# Patient Record
Sex: MALE | Race: WHITE | Employment: OTHER | URBAN - METROPOLITAN AREA
[De-identification: names, ages, dates, MRNs, and addresses within clinical notes are randomized per-mention and may not be internally consistent; named-entity substitution may affect disease eponyms.]

---

## 2018-07-11 ENCOUNTER — OFFICE VISIT (OUTPATIENT)
Dept: INTERNAL MEDICINE CLINIC | Age: 73
End: 2018-07-11
Payer: MEDICARE

## 2018-07-11 VITALS
SYSTOLIC BLOOD PRESSURE: 118 MMHG | HEIGHT: 72 IN | BODY MASS INDEX: 27.5 KG/M2 | WEIGHT: 203 LBS | DIASTOLIC BLOOD PRESSURE: 80 MMHG

## 2018-07-11 DIAGNOSIS — F51.01 PRIMARY INSOMNIA: ICD-10-CM

## 2018-07-11 DIAGNOSIS — R93.89 ABNORMAL FINDINGS ON DIAGNOSTIC IMAGING OF OTHER SPECIFIED BODY STRUCTURES: ICD-10-CM

## 2018-07-11 DIAGNOSIS — D61.818 PANCYTOPENIA (HCC): ICD-10-CM

## 2018-07-11 DIAGNOSIS — H91.93 DECREASED HEARING OF BOTH EARS: ICD-10-CM

## 2018-07-11 DIAGNOSIS — E78.2 MIXED HYPERLIPIDEMIA: ICD-10-CM

## 2018-07-11 DIAGNOSIS — J05.10: Primary | ICD-10-CM

## 2018-07-11 DIAGNOSIS — Z12.11 COLON CANCER SCREENING: ICD-10-CM

## 2018-07-11 PROBLEM — H91.90 HEARING DECREASED: Status: ACTIVE | Noted: 2018-07-11

## 2018-07-11 PROCEDURE — 1036F TOBACCO NON-USER: CPT | Performed by: INTERNAL MEDICINE

## 2018-07-11 PROCEDURE — 1101F PT FALLS ASSESS-DOCD LE1/YR: CPT | Performed by: INTERNAL MEDICINE

## 2018-07-11 PROCEDURE — 99204 OFFICE O/P NEW MOD 45 MIN: CPT | Performed by: INTERNAL MEDICINE

## 2018-07-11 PROCEDURE — 1123F ACP DISCUSS/DSCN MKR DOCD: CPT | Performed by: INTERNAL MEDICINE

## 2018-07-11 PROCEDURE — 4040F PNEUMOC VAC/ADMIN/RCVD: CPT | Performed by: INTERNAL MEDICINE

## 2018-07-11 PROCEDURE — G8427 DOCREV CUR MEDS BY ELIG CLIN: HCPCS | Performed by: INTERNAL MEDICINE

## 2018-07-11 PROCEDURE — 3017F COLORECTAL CA SCREEN DOC REV: CPT | Performed by: INTERNAL MEDICINE

## 2018-07-11 PROCEDURE — G8419 CALC BMI OUT NRM PARAM NOF/U: HCPCS | Performed by: INTERNAL MEDICINE

## 2018-07-11 RX ORDER — SIMVASTATIN 20 MG
TABLET ORAL
COMMUNITY
Start: 2018-04-06

## 2018-07-11 ASSESSMENT — ENCOUNTER SYMPTOMS
DIARRHEA: 0
COUGH: 0
SHORTNESS OF BREATH: 0
BLOOD IN STOOL: 0
EYE PAIN: 0
TROUBLE SWALLOWING: 0
EYE DISCHARGE: 0
COLOR CHANGE: 0
ABDOMINAL DISTENTION: 0
WHEEZING: 0

## 2018-07-11 ASSESSMENT — PATIENT HEALTH QUESTIONNAIRE - PHQ9
SUM OF ALL RESPONSES TO PHQ9 QUESTIONS 1 & 2: 0
SUM OF ALL RESPONSES TO PHQ QUESTIONS 1-9: 0
1. LITTLE INTEREST OR PLEASURE IN DOING THINGS: 0
2. FEELING DOWN, DEPRESSED OR HOPELESS: 0

## 2018-07-11 NOTE — PROGRESS NOTES
breath and wheezing. Cardiovascular: Negative for chest pain and palpitations. Gastrointestinal: Negative for abdominal distention, blood in stool and diarrhea. Endocrine: Negative for polydipsia and polyphagia. Genitourinary: Negative for difficulty urinating and frequency. Musculoskeletal: Negative for gait problem, myalgias and neck pain. Skin: Negative for color change and rash. Allergic/Immunologic: Negative for environmental allergies and food allergies. Neurological: Negative for dizziness and headaches. Hematological: Negative for adenopathy. Does not bruise/bleed easily. Psychiatric/Behavioral: Negative for behavioral problems and sleep disturbance. Objective:   Physical Exam   Constitutional: He is oriented to person, place, and time. He appears well-developed and well-nourished. HENT:   Head: Normocephalic and atraumatic. Hard of hearing     Eyes: Conjunctivae and EOM are normal. Right eye exhibits no discharge. Left eye exhibits no discharge. Right conjunctiva is not injected. Left conjunctiva is not injected. Right eye exhibits normal extraocular motion. Left eye exhibits normal extraocular motion. Neck: Normal range of motion. Neck supple. No JVD present. No edema and no erythema present. No thyroid mass and no thyromegaly present. Cardiovascular: Normal rate and regular rhythm. Exam reveals no friction rub. No murmur heard. Pulmonary/Chest: Effort normal and breath sounds normal. No accessory muscle usage. No tachypnea and no bradypnea. No respiratory distress. He has no wheezes. He has no rales. Abdominal: Soft. Bowel sounds are normal. He exhibits no distension. There is no tenderness. There is no rebound. Musculoskeletal: Normal range of motion. He exhibits no edema or tenderness. Lymphadenopathy:        Head (right side): No submental and no submandibular adenopathy present. Head (left side): No submental and no submandibular adenopathy present. He has no cervical adenopathy. Neurological: He is alert and oriented to person, place, and time. He displays no atrophy. No cranial nerve deficit or sensory deficit. He exhibits normal muscle tone. Coordination normal.   Skin: Skin is warm. No bruising, no ecchymosis and no rash noted. He is not diaphoretic. No pallor. Psychiatric: He has a normal mood and affect. His behavior is normal. His mood appears not anxious. His affect is not angry. His speech is not slurred. He is not aggressive. Cognition and memory are not impaired. He expresses no homicidal ideation. I have personally reviewed and agree with the patient FLY ABHISHEK Tata Guthrie is a 74 y.o. male who presents today for follow up on his chronic medical conditions as noted below. Ravi Ayala is c/o of   Chief Complaint   Patient presents with   Vaibhav Vidal New Doctor    Follow-Up from Hospital     Epiglottitis, thrombocytopenia, elevated bp    [facility] Maintenance     refusing colonoscopy, pneumovax        Patient Active Problem List:     Epiglottiditis     Mixed hyperlipidemia     Hearing decreased     Primary insomnia     Past Medical History:   Diagnosis Date    Hyperlipidemia       Past Surgical History:   Procedure Laterality Date    VASECTOMY       Family History   Problem Relation Age of Onset    Cancer Mother 62        lung    Diabetes Maternal Uncle      Current Outpatient Prescriptions   Medication Sig Dispense Refill    simvastatin (ZOCOR) 20 MG tablet        No current facility-administered medications for this visit.       ALLERGIES:    Allergies   Allergen Reactions    Doxycycline Hives    Oxycodone-Acetaminophen Hives    Pcn [Penicillins] Hives    Pneumococcal Vaccines Hives       Social History   Substance Use Topics    Smoking status: Former Smoker     Packs/day: 0.25     Years: 50.00     Types: Cigarettes     Quit date: 7/11/2007    Smokeless tobacco: Never Used    Alcohol use 1.2 oz/week     2

## 2018-07-25 ENCOUNTER — OFFICE VISIT (OUTPATIENT)
Dept: INTERNAL MEDICINE CLINIC | Age: 73
End: 2018-07-25
Payer: MEDICARE

## 2018-07-25 VITALS
BODY MASS INDEX: 27.36 KG/M2 | WEIGHT: 202 LBS | SYSTOLIC BLOOD PRESSURE: 118 MMHG | HEIGHT: 72 IN | DIASTOLIC BLOOD PRESSURE: 64 MMHG | OXYGEN SATURATION: 98 % | HEART RATE: 64 BPM

## 2018-07-25 DIAGNOSIS — D72.810 LYMPHOCYTOPENIA: ICD-10-CM

## 2018-07-25 DIAGNOSIS — D69.6 THROMBOCYTOPENIA, ACQUIRED (HCC): ICD-10-CM

## 2018-07-25 DIAGNOSIS — E78.2 MIXED HYPERLIPIDEMIA: Primary | ICD-10-CM

## 2018-07-25 PROCEDURE — 99213 OFFICE O/P EST LOW 20 MIN: CPT | Performed by: INTERNAL MEDICINE

## 2018-07-25 PROCEDURE — 1101F PT FALLS ASSESS-DOCD LE1/YR: CPT | Performed by: INTERNAL MEDICINE

## 2018-07-25 PROCEDURE — 3017F COLORECTAL CA SCREEN DOC REV: CPT | Performed by: INTERNAL MEDICINE

## 2018-07-25 PROCEDURE — G8419 CALC BMI OUT NRM PARAM NOF/U: HCPCS | Performed by: INTERNAL MEDICINE

## 2018-07-25 PROCEDURE — 4040F PNEUMOC VAC/ADMIN/RCVD: CPT | Performed by: INTERNAL MEDICINE

## 2018-07-25 PROCEDURE — 1123F ACP DISCUSS/DSCN MKR DOCD: CPT | Performed by: INTERNAL MEDICINE

## 2018-07-25 PROCEDURE — 1036F TOBACCO NON-USER: CPT | Performed by: INTERNAL MEDICINE

## 2018-07-25 PROCEDURE — G8427 DOCREV CUR MEDS BY ELIG CLIN: HCPCS | Performed by: INTERNAL MEDICINE

## 2018-07-25 ASSESSMENT — ENCOUNTER SYMPTOMS
EYE DISCHARGE: 0
COLOR CHANGE: 0
ABDOMINAL DISTENTION: 0
EYE PAIN: 0
DIARRHEA: 0
SHORTNESS OF BREATH: 0
WHEEZING: 0
BLOOD IN STOOL: 0
TROUBLE SWALLOWING: 0
COUGH: 0

## 2018-07-25 NOTE — PROGRESS NOTES
Subjective:      Patient ID: Mitchell Harmon is a 74 y.o. male. Visit Information    Have you changed or started any medications since your last visit including any over-the-counter medicines, vitamins, or herbal medicines? no   Are you having any side effects from any of your medications? -  no  Have you stopped taking any of your medications? Is so, why? -  no    Have you seen any other physician or provider since your last visit? No  Have you had any other diagnostic tests since your last visit? No  Have you been seen in the emergency room and/or had an admission to a hospital since we last saw you? No  Have you had your routine dental cleaning in the past 6 months? no    Have you activated your [employer] account? If not, what are your barriers? No: declined     Patient Care Team:  Guerline Shannon MD as PCP - General (Internal Medicine)    Medical History Review  Past Medical, Family, and Social History reviewed and does contribute to the patient presenting condition    Health Maintenance   Topic Date Due    AAA screen  1945    DTaP/Tdap/Td vaccine (1 - Tdap) 12/26/1962    Lipid screen  12/26/1983    Shingles Vaccine (1 of 2 - 2 Dose Series) 12/26/1993    Colon cancer screen colonoscopy  07/26/2019 (Originally 12/26/1993)    Flu vaccine (1) 09/01/2018     Chief Complaint   Patient presents with    Hyperlipidemia     had labs don in care every where        Here for f/u on labs  lwo blood count  No burise  HLD  Onset more than 5 years ago  Severity is mild, not getting worse  Not associated with pancreatitis  Tolerating statin well no muscle pain          Review of Systems   Constitutional: Negative for appetite change, diaphoresis and fatigue. HENT: Negative for ear discharge and trouble swallowing. Eyes: Negative for pain and discharge. Respiratory: Negative for cough, shortness of breath and wheezing. Cardiovascular: Negative for chest pain and palpitations.    Gastrointestinal: Negative

## 2018-08-01 ENCOUNTER — TELEPHONE (OUTPATIENT)
Dept: INTERNAL MEDICINE CLINIC | Age: 73
End: 2018-08-01

## 2018-08-01 NOTE — TELEPHONE ENCOUNTER
Patient had all his labs done at [facility]. Dr Lennox Allan already discussed these with him but he would like a copy. Please call his wife when you receive the hard copy and she will pick these up. They like to have copies because they travel a lot.

## 2018-08-03 NOTE — TELEPHONE ENCOUNTER
Still have not received hard copy. Spoke with pt and suggested that he sign up for [facility]. Pt is agreeable and will sign up today.

## 2018-09-26 ENCOUNTER — HOSPITAL ENCOUNTER (OUTPATIENT)
Age: 73
Setting detail: SPECIMEN
Discharge: HOME | End: 2018-09-26
Payer: MEDICARE

## 2018-09-26 DIAGNOSIS — D69.6 THROMBOCYTOPENIA, ACQUIRED (HCC): ICD-10-CM

## 2018-09-26 DIAGNOSIS — D72.810 LYMPHOCYTOPENIA: ICD-10-CM

## 2018-09-26 LAB
ABSOLUTE EOS #: 0.03 K/UL (ref 0–0.4)
ABSOLUTE IMMATURE GRANULOCYTE: 0 K/UL (ref 0–0.3)
ABSOLUTE LYMPH #: 1.11 K/UL (ref 1–4.8)
ABSOLUTE MONO #: 0.58 K/UL (ref 0.1–0.8)
BASOPHILS # BLD: 1 % (ref 0–2)
BASOPHILS ABSOLUTE: 0.03 K/UL (ref 0–0.2)
DIFFERENTIAL TYPE: ABNORMAL
EOSINOPHILS RELATIVE PERCENT: 1 % (ref 1–4)
HCT VFR BLD CALC: 37.2 % (ref 40.7–50.3)
HEMOGLOBIN: 12.5 G/DL (ref 13–17)
IMMATURE GRANULOCYTES: 0 %
LYMPHOCYTES # BLD: 45 % (ref 24–44)
MCH RBC QN AUTO: 34.4 PG (ref 25.2–33.5)
MCHC RBC AUTO-ENTMCNC: 33.6 G/DL (ref 28.4–34.8)
MCV RBC AUTO: 102.5 FL (ref 82.6–102.9)
MONOCYTES # BLD: 23 % (ref 1–7)
MORPHOLOGY: ABNORMAL
NRBC AUTOMATED: 0 PER 100 WBC
PDW BLD-RTO: 13.1 % (ref 11.8–14.4)
PLATELET # BLD: 99 K/UL (ref 138–453)
PLATELET ESTIMATE: ABNORMAL
PMV BLD AUTO: 12.6 FL (ref 8.1–13.5)
RBC # BLD: 3.63 M/UL (ref 4.21–5.77)
RBC # BLD: ABNORMAL 10*6/UL
SEG NEUTROPHILS: 30 % (ref 36–66)
SEGMENTED NEUTROPHILS ABSOLUTE COUNT: 0.75 K/UL (ref 1.8–7.7)
WBC # BLD: 2.5 K/UL (ref 3.5–11.3)
WBC # BLD: ABNORMAL 10*3/UL

## 2023-01-22 ENCOUNTER — APPOINTMENT (OUTPATIENT)
Dept: GENERAL RADIOLOGY | Age: 78
End: 2023-01-22
Payer: MEDICARE

## 2023-01-22 ENCOUNTER — HOSPITAL ENCOUNTER (INPATIENT)
Age: 78
LOS: 3 days | Discharge: HOME | End: 2023-01-25
Attending: EMERGENCY MEDICINE | Admitting: INTERNAL MEDICINE
Payer: MEDICARE

## 2023-01-22 ENCOUNTER — APPOINTMENT (OUTPATIENT)
Dept: CT IMAGING | Age: 78
End: 2023-01-22
Payer: MEDICARE

## 2023-01-22 DIAGNOSIS — R55 SYNCOPE AND COLLAPSE: Primary | ICD-10-CM

## 2023-01-22 PROBLEM — J44.9 COPD (CHRONIC OBSTRUCTIVE PULMONARY DISEASE) (HCC): Status: ACTIVE | Noted: 2023-01-22

## 2023-01-22 PROBLEM — G96.08 SUBDURAL HYGROMA: Status: ACTIVE | Noted: 2023-01-22

## 2023-01-22 PROBLEM — U07.1 COVID-19: Status: ACTIVE | Noted: 2023-01-22

## 2023-01-22 PROBLEM — I95.1 ORTHOSTATIC HYPOTENSION: Status: ACTIVE | Noted: 2023-01-22

## 2023-01-22 PROBLEM — M35.3 POLYMYALGIA RHEUMATICA (HCC): Status: ACTIVE | Noted: 2023-01-22

## 2023-01-22 LAB
ABSOLUTE EOS #: 0 K/UL (ref 0–0.4)
ABSOLUTE IMMATURE GRANULOCYTE: 0.37 K/UL (ref 0–0.3)
ABSOLUTE LYMPH #: 0.64 K/UL (ref 1–4.8)
ABSOLUTE MONO #: 0.64 K/UL (ref 0.1–0.8)
ANION GAP SERPL CALCULATED.3IONS-SCNC: 13 MMOL/L (ref 9–17)
ANION GAP: 13 MMOL/L (ref 7–16)
BASOPHILS # BLD: 1 % (ref 0–2)
BASOPHILS ABSOLUTE: 0.05 K/UL (ref 0–0.2)
BILIRUBIN URINE: NEGATIVE
BUN BLDV-MCNC: 20 MG/DL (ref 8–23)
CALCIUM SERPL-MCNC: 8.3 MG/DL (ref 8.6–10.4)
CHLORIDE BLD-SCNC: 99 MMOL/L (ref 98–107)
CO2: 22 MMOL/L (ref 20–31)
COLOR: YELLOW
COMMENT UA: NORMAL
CREAT SERPL-MCNC: 0.84 MG/DL (ref 0.7–1.2)
EGFR, POC: >60 ML/MIN/1.73M2
EOSINOPHILS RELATIVE PERCENT: 0 % (ref 1–4)
GFR SERPL CREATININE-BSD FRML MDRD: >60 ML/MIN/1.73M2
GLUCOSE BLD-MCNC: 112 MG/DL (ref 70–99)
GLUCOSE BLD-MCNC: 123 MG/DL (ref 74–100)
GLUCOSE URINE: NEGATIVE
HCO3 VENOUS: 24.8 MMOL/L (ref 22–29)
HCT VFR BLD CALC: 31.1 % (ref 40.7–50.3)
HEMOGLOBIN: 10.8 G/DL (ref 13–17)
IMMATURE GRANULOCYTES: 8 %
KETONES, URINE: NEGATIVE
LEUKOCYTE ESTERASE, URINE: NEGATIVE
LYMPHOCYTES # BLD: 14 % (ref 24–44)
MAGNESIUM: 1.5 MG/DL (ref 1.6–2.6)
MCH RBC QN AUTO: 36.5 PG (ref 25.2–33.5)
MCHC RBC AUTO-ENTMCNC: 34.7 G/DL (ref 28.4–34.8)
MCV RBC AUTO: 105.1 FL (ref 82.6–102.9)
MONOCYTES # BLD: 14 % (ref 1–7)
MORPHOLOGY: ABNORMAL
NITRITE, URINE: NEGATIVE
NRBC AUTOMATED: 0 PER 100 WBC
O2 SAT, VEN: 84 % (ref 60–85)
PCO2, VEN: 36.1 MM HG (ref 41–51)
PDW BLD-RTO: 13.1 % (ref 11.8–14.4)
PH UA: 7.5 (ref 5–8)
PH VENOUS: 7.45 (ref 7.32–7.43)
PLATELET # BLD: ABNORMAL K/UL (ref 138–453)
PLATELET, FLUORESCENCE: NORMAL K/UL (ref 138–453)
PO2, VEN: 47 MM HG (ref 30–50)
POC BUN: 22 MG/DL (ref 8–26)
POC CHLORIDE: 99 MMOL/L (ref 98–107)
POC CREATININE: 1.03 MG/DL (ref 0.51–1.19)
POC HEMATOCRIT: 32 % (ref 41–53)
POC HEMOGLOBIN: 10.9 G/DL (ref 13.5–17.5)
POC IONIZED CALCIUM: 1.14 MMOL/L (ref 1.15–1.33)
POC LACTIC ACID: 1.44 MMOL/L (ref 0.56–1.39)
POC POTASSIUM: 4.3 MMOL/L (ref 3.5–4.5)
POC SODIUM: 135 MMOL/L (ref 138–146)
POC TCO2: 24 MMOL/L (ref 22–30)
POSITIVE BASE EXCESS, VEN: 1 (ref 0–3)
POTASSIUM SERPL-SCNC: 3.9 MMOL/L (ref 3.7–5.3)
PROTEIN UA: NEGATIVE
RBC # BLD: 2.96 M/UL (ref 4.21–5.77)
SEG NEUTROPHILS: 63 % (ref 36–66)
SEGMENTED NEUTROPHILS ABSOLUTE COUNT: 2.9 K/UL (ref 1.8–7.7)
SODIUM BLD-SCNC: 134 MMOL/L (ref 135–144)
SPECIFIC GRAVITY UA: 1.02 (ref 1–1.03)
TROPONIN, HIGH SENSITIVITY: 8 NG/L (ref 0–22)
TROPONIN, HIGH SENSITIVITY: 9 NG/L (ref 0–22)
TURBIDITY: CLEAR
URINE HGB: NEGATIVE
UROBILINOGEN, URINE: NORMAL
WBC # BLD: 4.6 K/UL (ref 3.5–11.3)

## 2023-01-22 PROCEDURE — 80048 BASIC METABOLIC PNL TOTAL CA: CPT

## 2023-01-22 PROCEDURE — 2580000003 HC RX 258

## 2023-01-22 PROCEDURE — 6360000002 HC RX W HCPCS

## 2023-01-22 PROCEDURE — 84484 ASSAY OF TROPONIN QUANT: CPT

## 2023-01-22 PROCEDURE — 2580000003 HC RX 258: Performed by: HEALTH CARE PROVIDER

## 2023-01-22 PROCEDURE — 82803 BLOOD GASES ANY COMBINATION: CPT

## 2023-01-22 PROCEDURE — 6360000002 HC RX W HCPCS: Performed by: HEALTH CARE PROVIDER

## 2023-01-22 PROCEDURE — 80051 ELECTROLYTE PANEL: CPT

## 2023-01-22 PROCEDURE — 85025 COMPLETE CBC W/AUTO DIFF WBC: CPT

## 2023-01-22 PROCEDURE — 71045 X-RAY EXAM CHEST 1 VIEW: CPT

## 2023-01-22 PROCEDURE — 85055 RETICULATED PLATELET ASSAY: CPT

## 2023-01-22 PROCEDURE — 2060000000 HC ICU INTERMEDIATE R&B

## 2023-01-22 PROCEDURE — 84520 ASSAY OF UREA NITROGEN: CPT

## 2023-01-22 PROCEDURE — 96374 THER/PROPH/DIAG INJ IV PUSH: CPT

## 2023-01-22 PROCEDURE — 81003 URINALYSIS AUTO W/O SCOPE: CPT

## 2023-01-22 PROCEDURE — 6360000002 HC RX W HCPCS: Performed by: EMERGENCY MEDICINE

## 2023-01-22 PROCEDURE — 99222 1ST HOSP IP/OBS MODERATE 55: CPT | Performed by: PSYCHIATRY & NEUROLOGY

## 2023-01-22 PROCEDURE — 96375 TX/PRO/DX INJ NEW DRUG ADDON: CPT

## 2023-01-22 PROCEDURE — 71260 CT THORAX DX C+: CPT | Performed by: HEALTH CARE PROVIDER

## 2023-01-22 PROCEDURE — 83735 ASSAY OF MAGNESIUM: CPT

## 2023-01-22 PROCEDURE — 99285 EMERGENCY DEPT VISIT HI MDM: CPT

## 2023-01-22 PROCEDURE — 82565 ASSAY OF CREATININE: CPT

## 2023-01-22 PROCEDURE — 85014 HEMATOCRIT: CPT

## 2023-01-22 PROCEDURE — 83605 ASSAY OF LACTIC ACID: CPT

## 2023-01-22 PROCEDURE — 70450 CT HEAD/BRAIN W/O DYE: CPT

## 2023-01-22 PROCEDURE — 99223 1ST HOSP IP/OBS HIGH 75: CPT | Performed by: INTERNAL MEDICINE

## 2023-01-22 PROCEDURE — 6360000004 HC RX CONTRAST MEDICATION: Performed by: HEALTH CARE PROVIDER

## 2023-01-22 PROCEDURE — 93005 ELECTROCARDIOGRAM TRACING: CPT | Performed by: HEALTH CARE PROVIDER

## 2023-01-22 PROCEDURE — 82947 ASSAY GLUCOSE BLOOD QUANT: CPT

## 2023-01-22 PROCEDURE — 82330 ASSAY OF CALCIUM: CPT

## 2023-01-22 PROCEDURE — 72125 CT NECK SPINE W/O DYE: CPT

## 2023-01-22 RX ORDER — SODIUM CHLORIDE 9 MG/ML
INJECTION, SOLUTION INTRAVENOUS PRN
Status: DISCONTINUED | OUTPATIENT
Start: 2023-01-22 | End: 2023-01-25 | Stop reason: HOSPADM

## 2023-01-22 RX ORDER — DEXAMETHASONE 4 MG/1
4 TABLET ORAL EVERY 12 HOURS SCHEDULED
Status: COMPLETED | OUTPATIENT
Start: 2023-01-22 | End: 2023-01-24

## 2023-01-22 RX ORDER — SODIUM CHLORIDE 0.9 % (FLUSH) 0.9 %
5-40 SYRINGE (ML) INJECTION PRN
Status: DISCONTINUED | OUTPATIENT
Start: 2023-01-22 | End: 2023-01-25 | Stop reason: HOSPADM

## 2023-01-22 RX ORDER — MAGNESIUM SULFATE IN WATER 40 MG/ML
2000 INJECTION, SOLUTION INTRAVENOUS ONCE
Status: COMPLETED | OUTPATIENT
Start: 2023-01-22 | End: 2023-01-22

## 2023-01-22 RX ORDER — ACETAMINOPHEN 650 MG/1
650 SUPPOSITORY RECTAL EVERY 6 HOURS PRN
Status: DISCONTINUED | OUTPATIENT
Start: 2023-01-22 | End: 2023-01-25 | Stop reason: HOSPADM

## 2023-01-22 RX ORDER — AZITHROMYCIN 250 MG/1
250 TABLET, FILM COATED ORAL DAILY
Status: COMPLETED | OUTPATIENT
Start: 2023-01-23 | End: 2023-01-24

## 2023-01-22 RX ORDER — ONDANSETRON 2 MG/ML
4 INJECTION INTRAMUSCULAR; INTRAVENOUS EVERY 6 HOURS PRN
Status: DISCONTINUED | OUTPATIENT
Start: 2023-01-22 | End: 2023-01-25 | Stop reason: HOSPADM

## 2023-01-22 RX ORDER — POLYETHYLENE GLYCOL 3350 17 G/17G
17 POWDER, FOR SOLUTION ORAL DAILY PRN
Status: DISCONTINUED | OUTPATIENT
Start: 2023-01-22 | End: 2023-01-25 | Stop reason: HOSPADM

## 2023-01-22 RX ORDER — ENOXAPARIN SODIUM 100 MG/ML
40 INJECTION SUBCUTANEOUS DAILY
Status: DISCONTINUED | OUTPATIENT
Start: 2023-01-22 | End: 2023-01-25 | Stop reason: HOSPADM

## 2023-01-22 RX ORDER — PREDNISONE 1 MG/1
5 TABLET ORAL DAILY
Status: ON HOLD | COMMUNITY
End: 2023-01-25 | Stop reason: SDUPTHER

## 2023-01-22 RX ORDER — SODIUM CHLORIDE 9 MG/ML
INJECTION, SOLUTION INTRAVENOUS CONTINUOUS
Status: DISCONTINUED | OUTPATIENT
Start: 2023-01-22 | End: 2023-01-24

## 2023-01-22 RX ORDER — 0.9 % SODIUM CHLORIDE 0.9 %
1000 INTRAVENOUS SOLUTION INTRAVENOUS ONCE
Status: COMPLETED | OUTPATIENT
Start: 2023-01-22 | End: 2023-01-22

## 2023-01-22 RX ORDER — ONDANSETRON 4 MG/1
4 TABLET, ORALLY DISINTEGRATING ORAL EVERY 8 HOURS PRN
Status: DISCONTINUED | OUTPATIENT
Start: 2023-01-22 | End: 2023-01-25 | Stop reason: HOSPADM

## 2023-01-22 RX ORDER — SODIUM CHLORIDE 0.9 % (FLUSH) 0.9 %
5-40 SYRINGE (ML) INJECTION EVERY 12 HOURS SCHEDULED
Status: DISCONTINUED | OUTPATIENT
Start: 2023-01-22 | End: 2023-01-25 | Stop reason: HOSPADM

## 2023-01-22 RX ORDER — MORPHINE SULFATE 4 MG/ML
4 INJECTION, SOLUTION INTRAMUSCULAR; INTRAVENOUS ONCE
Status: COMPLETED | OUTPATIENT
Start: 2023-01-22 | End: 2023-01-22

## 2023-01-22 RX ORDER — ACETAMINOPHEN 325 MG/1
650 TABLET ORAL EVERY 6 HOURS PRN
Status: DISCONTINUED | OUTPATIENT
Start: 2023-01-22 | End: 2023-01-25 | Stop reason: HOSPADM

## 2023-01-22 RX ADMIN — MAGNESIUM SULFATE HEPTAHYDRATE 2000 MG: 40 INJECTION, SOLUTION INTRAVENOUS at 12:04

## 2023-01-22 RX ADMIN — MORPHINE SULFATE 4 MG: 4 INJECTION INTRAVENOUS at 13:24

## 2023-01-22 RX ADMIN — SODIUM CHLORIDE 1000 ML: 9 INJECTION, SOLUTION INTRAVENOUS at 11:03

## 2023-01-22 RX ADMIN — SODIUM CHLORIDE: 9 INJECTION, SOLUTION INTRAVENOUS at 18:30

## 2023-01-22 RX ADMIN — ENOXAPARIN SODIUM 40 MG: 100 INJECTION SUBCUTANEOUS at 21:24

## 2023-01-22 RX ADMIN — IOPAMIDOL 75 ML: 755 INJECTION, SOLUTION INTRAVENOUS at 11:27

## 2023-01-22 ASSESSMENT — ENCOUNTER SYMPTOMS
NAUSEA: 0
DIARRHEA: 0
WHEEZING: 0
ABDOMINAL PAIN: 0
BACK PAIN: 0
RHINORRHEA: 0
SHORTNESS OF BREATH: 0
COUGH: 0
VOMITING: 0
CONSTIPATION: 0
EYE DISCHARGE: 0
COUGH: 1
SORE THROAT: 0
EYE ITCHING: 0
VOICE CHANGE: 0

## 2023-01-22 ASSESSMENT — PAIN SCALES - GENERAL: PAINLEVEL_OUTOF10: 5

## 2023-01-22 NOTE — ACP (ADVANCE CARE PLANNING)
..Advance Care Planning     Advance Care Planning Activator (Inpatient)  Conversation Note      Date of ACP Conversation: 1/22/2023     Conversation Conducted with: Patient with Decision Making Capacity    ACP Activator: Travis Coulterparis, [address] Decision Maker:     Current Designated Health Care Decision Maker:     Primary Decision Maker: Angelina Newberry - [phone]    Today we documented Decision Maker(s) consistent with ACP documents on file. Care Preferences    Ventilation: \"If you were in your present state of health and suddenly became very ill and were unable to breathe on your own, what would your preference be about the use of a ventilator (breathing machine) if it were available to you? \"      Would the patient desire the use of ventilator (breathing machine)?: no    \"If your health worsens and it becomes clear that your chance of recovery is unlikely, what would your preference be about the use of a ventilator (breathing machine) if it were available to you? \"     Would the patient desire the use of ventilator (breathing machine)?: No      Resuscitation  \"CPR works best to restart the heart when there is a sudden event, like a heart attack, in someone who is otherwise healthy. Unfortunately, CPR does not typically restart the heart for people who have serious health conditions or who are very sick. \"    \"In the event your heart stopped as a result of an underlying serious health condition, would you want attempts to be made to restart your heart (answer \"yes\" for attempt to resuscitate) or would you prefer a natural death (answer \"no\" for do not attempt to resuscitate)? \" yes       [] Yes   [x] No   Educated Patient / Mortimer Hoar regarding differences between Advance Directives and portable DNR orders.     Length of ACP Conversation in minutes:      Conversation Outcomes:  [x] ACP discussion completed  [] Existing advance directive reviewed with patient; no changes to patient's previously recorded wishes  [] New Advance Directive completed  [] Portable Do Not Rescitate prepared for Provider review and signature  [] POLST/POST/MOLST/MOST prepared for Provider review and signature      Follow-up plan:    [] Schedule follow-up conversation to continue planning  [] Referred individual to Provider for additional questions/concerns   [x] Advised patient/agent/surrogate to review completed ACP document and update if needed with changes in condition, patient preferences or care setting    [] This note routed to one or more involved healthcare providers

## 2023-01-22 NOTE — ED PROVIDER NOTES
[facility] ED  Emergency Department Encounter  Emergency Medicine Resident     Pt Alisia Dey MACEY Lamas  MRN: [id number]  [place] 1945  Date of evaluation: 1/22/23  PCP:  Alie Chakraborty MD  Note Started: 10:37 AM EST      CHIEF COMPLAINT       Chief Complaint   Patient presents with    Dizziness       HISTORY OF PRESENT ILLNESS  (Location/Symptom, Timing/Onset, Context/Setting, Quality, Duration, Modifying Factors, Severity.)      Brynn Schroeder is a 76 y.o. male w/ a past medical history significant for leukopenia, anemia, GERD, hyperlipidemiawho presents with concerns for syncopal episode. Patient reports via EMS today for concerns of a syncopal episode at home. Patient was eating breakfast when he states he passed out. He does not remember passing out. Did hit head and was incontinent of urine. Denies any seizure history, no concern for any seizure-like activity. Patient was recently discharged from [facility] after being admitted for COVID-19 pneumonia. Patient was seen at hospital, chest x-ray showed a left lower lobe infiltrate and he tested positive for COVID. Was found to have some multifocal groundglass opacities on the lower extremity visualized in the apical portion on the CT soft tissue benign. Patient was having difficulty swallowing pills due to some pharyngitis and some slight mucosal thickening of the epiglottis. Patient was treated with IV dexamethasone, admitted and treated with IV antibiotics, hydration, IV Decadron, IV remdesivir and had eventually passed a swallow study. Was discharged home on Decadron without any O2 requirements. Patient was discharged on 1/19/22 a course of Keflex 500 mg 3 times a day for the next 5 days as well as Decadron 4 mg twice daily for 5 days. Patient states that he has been fatigued and has had a persistent dry cough but otherwise denies any other complaints at this time.     PAST MEDICAL / SURGICAL / SOCIAL / FAMILY HISTORY      has a past medical history of Hyperlipidemia.  leukopenia, anemia, GERD, hyperlipidemia     has a past surgical history that includes Vasectomy. Social History     Socioeconomic History    Marital status:      Spouse name: Not on file    Number of children: Not on file    Years of education: Not on file    Highest education level: Not on file   Occupational History    Not on file   Tobacco Use    Smoking status: Former     Packs/day: 0.25     Years: 50.00     Pack years: 12.50     Types: Cigarettes     Quit date: 7/11/2007     Years since quitting: 15.5    Smokeless tobacco: Never   Substance and Sexual Activity    Alcohol use: Yes     Alcohol/week: 2.0 standard drinks     Types: 2 Shots of liquor per week    Drug use: No    Sexual activity: Not on file   Other Topics Concern    Not on file   Social History Narrative    Not on file     Social Determinants of Health     Financial Resource Strain: Not on file   Food Insecurity: Not on file   Transportation Needs: Not on file   Physical Activity: Not on file   Stress: Not on file   Social Connections: Not on file   Intimate Partner Violence: Not on file   Housing Stability: Not on file       Family History   Problem Relation Age of Onset    Cancer Mother 62        lung    Diabetes Maternal Uncle        Allergies:  Doxycycline, Oxycodone-acetaminophen, Pcn [penicillins], and Pneumococcal vaccines    Home Medications:  Prior to Admission medications    Medication Sig Start Date End Date Taking? Authorizing Provider   simvastatin (ZOCOR) 20 MG tablet  4/6/18   Historical Provider, MD       REVIEW OF SYSTEMS       Review of Systems   Constitutional:  Positive for fatigue. Negative for chills and fever. HENT:  Negative for ear pain, hearing loss and sore throat. Eyes:  Negative for visual disturbance. Respiratory:  Positive for cough. Negative for shortness of breath. Cardiovascular:  Negative for chest pain.    Gastrointestinal: Negative for abdominal pain, constipation, diarrhea, nausea and vomiting. Genitourinary:  Negative for difficulty urinating and dysuria. Musculoskeletal:  Negative for arthralgias and myalgias. Neurological:  Negative for numbness. Psychiatric/Behavioral:  Negative for agitation and confusion. PHYSICAL EXAM      INITIAL VITALS:   /74   Pulse 68   Temp 97.9 °F (36.6 °C) (Oral)   Resp 18   SpO2 95%     Physical Exam  Vitals and nursing note reviewed. Constitutional:       General: He is not in acute distress. Appearance: He is well-developed. He is not diaphoretic. HENT:      Head: Normocephalic and atraumatic. Right Ear: External ear normal.      Left Ear: External ear normal.      Nose: Nose normal.   Eyes:      Conjunctiva/sclera: Conjunctivae normal.   Neck:      Trachea: No tracheal deviation. Cardiovascular:      Rate and Rhythm: Normal rate and regular rhythm. Heart sounds: Normal heart sounds. No murmur heard. No friction rub. No gallop. Pulmonary:      Effort: Pulmonary effort is normal. No respiratory distress. Breath sounds: Normal breath sounds. No wheezing, rhonchi or rales. Abdominal:      General: Bowel sounds are normal.      Palpations: Abdomen is soft. Tenderness: There is no abdominal tenderness. Musculoskeletal:         General: No tenderness. Normal range of motion. Cervical back: Neck supple. Skin:     General: Skin is warm and dry. Capillary Refill: Capillary refill takes less than 2 seconds. Neurological:      Mental Status: He is alert and oriented to person, place, and time. Motor: No abnormal muscle tone. DDX/DIAGNOSTIC RESULTS / EMERGENCY DEPARTMENT COURSE / MDM     Medical Decision Making  59-year-old male presents today with concern for syncopal episode.,  Examination patient was in no acute distress, speaking full sentences, AOx3 and mentating appropriately.   Denies any complaints at this time other than feeling some persistent fatigue since testing positive for COVID. At this time, lung sounds are clear bilaterally, patient is saturating at 88-89% on room air, no history of any COPD. Patient does have a small superficial laceration above the right eyebrow but otherwise denies any headaches or dizziness or neck pain at this time. Patient denies any recent admission for COVID-19, see HPI. Given syncopal episode, will plan for CT of the head and CT of the neck, patient denies being on any blood thinning medication did not see any history of clots on chart review. Concern for pulmonary embolism in setting of syncope and recent COVID infection, will obtain CT for pulmonary embolism rule out. Per EMS, there was some concern for a prolonged pause on the monitor with associated brief loss of consciousness that resolved spontaneously while in route. Patient denies any history of cardiac issues, we will plan for EKG, troponin, will place patient on the monitor and continue to reassess. Amount and/or Complexity of Data Reviewed  Independent Historian: EMS  External Data Reviewed: labs, radiology, ECG and notes. Details: See HPI. Labs: ordered. Decision-making details documented in ED Course. Radiology: ordered and independent interpretation performed. Decision-making details documented in ED Course. ECG/medicine tests: ordered and independent interpretation performed. Decision-making details documented in ED Course. Risk  Prescription drug management. Decision regarding hospitalization.         EKG  EKG Interpretation    Interpreted by me    Rhythm: normal sinus   Rate: 68bpm  Axis: normal  Ectopy: none  Conduction: RBBB  ST Segments: no acute change  T Waves: no acute change  Q Waves: none    Clinical Impression: RBBB    All EKG's are interpreted by the Emergency Department Physician who either signs or Co-signs this chart in the absence of a cardiologist.    EMERGENCY DEPARTMENT COURSE:    ED Course as of 01/22/23 1345   Sun Jan 22, 2023   1129 Troponin, High Sensitivity: 8 [JS]   1134 WBC: 4.6 [JS]   1137 CXR -- Cardiomegaly and mild pulmonary vascular congestion. Small left-sided  pleural effusion and left basilar airspace disease, atelectasis vs infiltrate. [JS]   1138 Sodium(!): 134 [JS]   1138 Magnesium(!): 1.5  Will replace. [JS]   8813 IMPRESSION:  1. Mild scattered opacities in the lungs could represent sequela of COVID-atypical viral pneumonia/residual pneumonia. Moderate emphysema. Mild dependent atelectasis, respiratory motion and parenchymal banding. 2. No clear evidence for central pulmonary embolus within the limitations of this study. 3. Coronary artery disease. Cardiomegaly. Atherosclerotic calcification of the aorta. [JS]   1153 IMPRESSION:  1. Moderate bifrontal and vertex atrophy versus less likely bilateral subdural hygromas in the bifrontal regions, right greater than left. Extra-axial space on the right measures 8 mm and measures 4 mm on the left. 2. No acute intracranial hemorrhage or midline shift. 3. Mild-to-moderate chronic small vessel ischemic white matter disease. 4. Mild ethmoid sinus disease. [JS]   1236 UA negative. [JS]   1900 Patient reevaluated at bedside, still requiring O2. Reconfirmed no recent cardiac workup/ECHO. [JS]   0290 Discussed with medicine will come evaluate patient. [JS]   4813 Internal medicine team was at bedside for admission. [JS]      ED Course User Index  [JS] Shon Bowers DO       PROCEDURES:    CONSULTS:  IP CONSULT TO INTERNAL MEDICINE  IP CONSULT TO CARDIOLOGY  IP CONSULT TO CASE MANAGEMENT    CRITICAL CARE:    FINAL IMPRESSION      1. Syncope and collapse          DISPOSITION / PLAN     DISPOSITION Admitted 01/22/2023 01:44:33 PM      PATIENT REFERRED TO:  No follow-up provider specified.     DISCHARGE MEDICATIONS:  New Prescriptions    No medications on file       Destini Kumari DO  Emergency Medicine Resident    (Please note that portions of thisnote were completed with a voice recognition program.  Efforts were made to edit the dictations but occasionally words are mis-transcribed.)      Sivan Lewis,   Resident  01/22/23 9524

## 2023-01-22 NOTE — ED PROVIDER NOTES
[facility]     Emergency Department     Faculty Attestation    I performed a history and physical examination of the patient and discussed management with the resident. I reviewed the residents note and agree with the documented findings and plan of care. Any areas of disagreement are noted on the chart. I was personally present for the key portions of any procedures. I have documented in the chart those procedures where I was not present during the key portions. I have reviewed the emergency nurses triage note. I agree with the chief complaint, past medical history, past surgical history, allergies, medications, social and family history as documented unless otherwise noted below. For Physician Assistant/ Nurse Practitioner cases/documentation I have personally evaluated this patient and have completed at least one if not all key elements of the E/M (history, physical exam, and MDM). Additional findings are as noted. Patient presents after he had what sounds like 2 syncopal events at home today. Patient did fall and has a small laceration to his eyebrow. Patient was just discharged from [facility] after being admitted for COVID-pneumonia. Patient states that he did receive remdesivir while he was there. He was also treated with antibiotics. Patient was discharged home 2 days ago and says he was not discharged on any oxygen as his oxygen levels were good at that time. He says that he was doing okay until today when he became dizzy and then had the episodes of syncope. According to EMS, they believe the patient had an episode of asystole but no interventions were done and patient woke up and became alert on his own. Patient states that he is not on any anticoagulation. Patient was found to have an oxygen saturation of 89% on room air on arrival here. On my exam, patient is alert and oriented and answering questions appropriately.   There is a small laceration to the right eyebrow. Lungs are clear to auscultation bilaterally. Abdomen is soft and nontender. The bilateral calves are nontender nonswollen. Will get EKG, CT scan of the head, neck, and chest.  Will check labs and plan to admit patient.     EKG Interpretation    Interpreted by emergency department physician    Rhythm: normal sinus   Rate: normal  Axis: normal  Ectopy: none  Conduction: right bundle branch block (complete)  ST Segments: nonspecific changes  T Waves: non specific changes  Q Waves: nonspecific    Clinical Impression: non-specific EKG and right bundle branch block    MD Cheryle Proctor MD  Attending Emergency  Physician            Richardson Kaye MD  01/22/23 8079

## 2023-01-22 NOTE — H&P
[facility]     Department of Internal Medicine - Staff Internal Medicine Teaching Service          ADMISSION NOTE/HISTORY AND PHYSICAL EXAMINATION   Date: 1/22/2023  Patient Name: Emmanuel Davis  Date of admission: 1/22/2023 10:36 AM  YOB: 1945  PCP: Chantelle Carranza MD  History Obtained From:  patient, wife    CHIEF COMPLAINT     Chief complaint: Syncope    HISTORY OF PRESENTING ILLNESS     The patient is a pleasant 76 y.o. male presents with a chief complaint of syncope. He has a past medical history of Polymyalgia rheumatica on prednisone, GERD, pancytopenia, epiglottitis, hearing loss, hyperlipidemia. According to wife patient was standing near the bar top when he had an episode of syncope. Patient does not remember passing out, he did hit his head and was incontinent of urine. The patient then gradually regained consciousness. Wife called the EMS who brought the patient to the hospital.  According to the EMS the patient had 1 episode of asystole but the patient woke up and became alert on his own. Denies any chest pain, lightheadedness, dizziness, nausea, vomiting. Patient was recently admitted to [facility] for COVID-19 infection with left lower lobe infiltrate. CT scan showed concerns of epiglottitis. Patient was discharged on Keflex and Decadron. He has history of polymyalgia rheumatica and has been on prednisone for 2 years. On arrival patient was hypoxic, BP 96/56, was started on 2 L via nasal cannula. Review of Systems   Constitutional:  Negative for activity change, chills, diaphoresis and fever. HENT:  Negative for congestion, rhinorrhea, sore throat and voice change. Eyes:  Negative for discharge and itching. Respiratory:  Negative for cough, shortness of breath and wheezing. Cardiovascular:  Positive for chest pain. Negative for palpitations. Gastrointestinal:  Negative for abdominal pain, nausea and vomiting. Endocrine: Negative for cold intolerance. Genitourinary:  Negative for difficulty urinating, frequency and urgency. Musculoskeletal:  Negative for arthralgias, back pain and myalgias. Skin:  Positive for wound. Negative for pallor. Neurological:  Negative for dizziness. Psychiatric/Behavioral:  Negative for agitation. PAST MEDICAL HISTORY     Past Medical History:   Diagnosis Date    Hyperlipidemia        PAST SURGICAL HISTORY     Past Surgical History:   Procedure Laterality Date    VASECTOMY         ALLERGIES     Doxycycline, Oxycodone-acetaminophen, Pcn [penicillins], and Pneumococcal vaccines    MEDICATIONS PRIOR TO ADMISSION     Prior to Admission medications    Medication Sig Start Date End Date Taking? Authorizing Provider   simvastatin (ZOCOR) 20 MG tablet  18   Historical Provider, MD       SOCIAL HISTORY     Tobacco: Denies  Alcohol: Denies  Illicits: Denies    FAMILY HISTORY     Family History   Problem Relation Age of Onset    Cancer Mother 62        lung    Diabetes Maternal Uncle        PHYSICAL EXAM     Vitals: BP (!) 108/59   Pulse 68   Temp 97.9 °F (36.6 °C) (Oral)   Resp 18   SpO2 94%   Tmax: Temp (24hrs), Av.9 °F (36.6 °C), Min:97.9 °F (36.6 °C), Max:97.9 °F (36.6 °C)    Last Body weight:   Wt Readings from Last 3 Encounters:   18 202 lb (91.6 kg)   18 203 lb (92.1 kg)     Body Mass Index : There is no height or weight on file to calculate BMI. Physical Exam  Constitutional:       General: He is not in acute distress. Appearance: Normal appearance. He is not ill-appearing. HENT:      Head: Normocephalic. Nose: Nose normal.   Eyes:      Conjunctiva/sclera: Conjunctivae normal.      Pupils: Pupils are equal, round, and reactive to light. Comments: Wound over right eyebrow. Cardiovascular:      Rate and Rhythm: Normal rate and regular rhythm. Pulses: Normal pulses. Heart sounds: No murmur heard.   Pulmonary:      Effort: Pulmonary effort is normal. No respiratory distress. Breath sounds: Rhonchi present. No wheezing. Abdominal:      General: Bowel sounds are normal. There is no distension. Palpations: Abdomen is soft. Tenderness: There is no abdominal tenderness. Musculoskeletal:         General: Normal range of motion. Cervical back: Normal range of motion and neck supple. Right lower leg: No edema. Left lower leg: No edema. Skin:     Capillary Refill: Capillary refill takes less than 2 seconds. Findings: Lesion present. No bruising or erythema. Neurological:      General: No focal deficit present. Mental Status: He is alert. Mental status is at baseline.    Psychiatric:         Mood and Affect: Mood normal.         Behavior: Behavior normal.              INVESTIGATIONS     Laboratory Testing:     Recent Results (from the past 24 hour(s))   Venous Blood Gas, POC    Collection Time: 01/22/23 10:44 AM   Result Value Ref Range    pH, Babatunde 7.445 (H) 7.320 - 7.430    pCO2, Babatunde 36.1 (L) 41.0 - 51.0 mm Hg    pO2, Babatunde 47.0 30.0 - 50.0 mm Hg    HCO3, Venous 24.8 22.0 - 29.0 mmol/L    Positive Base Excess, Babatunde 1 0.0 - 3.0    O2 Sat, Babatunde 84 60.0 - 85.0 %   ELECTROLYTES PLUS    Collection Time: 01/22/23 10:44 AM   Result Value Ref Range    POC Sodium 135 (L) 138 - 146 mmol/L    POC Potassium 4.3 3.5 - 4.5 mmol/L    POC Chloride 99 98 - 107 mmol/L    POC TCO2 24 22 - 30 mmol/L    Anion Gap 13 7 - 16 mmol/L   Hemoglobin and hematocrit, blood    Collection Time: 01/22/23 10:44 AM   Result Value Ref Range    POC Hemoglobin 10.9 (L) 13.5 - 17.5 g/dL    POC Hematocrit 32 (L) 41 - 53 %   Creatinine W/GFR Point of Care    Collection Time: 01/22/23 10:44 AM   Result Value Ref Range    POC Creatinine 1.03 0.51 - 1.19 mg/dL    eGFR, POC >60 mL/min/1.73m2   CALCIUM, IONIC (POC)    Collection Time: 01/22/23 10:44 AM   Result Value Ref Range    POC Ionized Calcium 1.14 (L) 1.15 - 1.33 mmol/L   POCT urea (BUN) Collection Time: 01/22/23 10:44 AM   Result Value Ref Range    POC BUN 22 8 - 26 mg/dL   Lactic Acid, POC    Collection Time: 01/22/23 10:44 AM   Result Value Ref Range    POC Lactic Acid 1.44 (H) 0.56 - 1.39 mmol/L   POCT Glucose    Collection Time: 01/22/23 10:44 AM   Result Value Ref Range    POC Glucose 123 (H) 74 - 100 mg/dL   CBC with Auto Differential    Collection Time: 01/22/23 10:57 AM   Result Value Ref Range    WBC 4.6 3.5 - 11.3 k/uL    RBC 2.96 (L) 4.21 - 5.77 m/uL    Hemoglobin 10.8 (L) 13.0 - 17.0 g/dL    Hematocrit 31.1 (L) 40.7 - 50.3 %    .1 (H) 82.6 - 102.9 fL    MCH 36.5 (H) 25.2 - 33.5 pg    MCHC 34.7 28.4 - 34.8 g/dL    RDW 13.1 11.8 - 14.4 %    Platelets See Reflexed IPF Result 138 - 453 k/uL    NRBC Automated 0.0 0.0 per 100 WBC    Immature Granulocytes 8 (H) 0 %    Seg Neutrophils 63 36 - 66 %    Lymphocytes 14 (L) 24 - 44 %    Monocytes 14 (H) 1 - 7 %    Eosinophils % 0 (L) 1 - 4 %    Basophils 1 0 - 2 %    Absolute Immature Granulocyte 0.37 (H) 0.00 - 0.30 k/uL    Segs Absolute 2.90 1.8 - 7.7 k/uL    Absolute Lymph # 0.64 (L) 1.0 - 4.8 k/uL    Absolute Mono # 0.64 0.1 - 0.8 k/uL    Absolute Eos # 0.00 0.0 - 0.4 k/uL    Basophils Absolute 0.05 0.0 - 0.2 k/uL    Morphology MACROCYTOSIS PRESENT    Basic Metabolic Panel    Collection Time: 01/22/23 10:57 AM   Result Value Ref Range    Glucose 112 (H) 70 - 99 mg/dL    BUN 20 8 - 23 mg/dL    Creatinine 0.84 0.70 - 1.20 mg/dL    Est, Glom Filt Rate >60 >60 mL/min/1.73m2    Calcium 8.3 (L) 8.6 - 10.4 mg/dL    Sodium 134 (L) 135 - 144 mmol/L    Potassium 3.9 3.7 - 5.3 mmol/L    Chloride 99 98 - 107 mmol/L    CO2 22 20 - 31 mmol/L    Anion Gap 13 9 - 17 mmol/L   Magnesium    Collection Time: 01/22/23 10:57 AM   Result Value Ref Range    Magnesium 1.5 (L) 1.6 - 2.6 mg/dL   Troponin    Collection Time: 01/22/23 10:57 AM   Result Value Ref Range    Troponin, High Sensitivity 8 0 - 22 ng/L   Immature Platelet Fraction    Collection Time: 01/22/23 10:57 AM   Result Value Ref Range    Platelet, Fluorescence Platelet clumps present, count appears adequate. 138 - 453 k/uL   Troponin    Collection Time: 01/22/23 12:11 PM   Result Value Ref Range    Troponin, High Sensitivity 9 0 - 22 ng/L   Urinalysis with Reflex to Culture    Collection Time: 01/22/23 12:11 PM    Specimen: Urine   Result Value Ref Range    Color, UA Yellow Yellow    Turbidity UA Clear Clear    Glucose, Ur NEGATIVE NEGATIVE    Bilirubin Urine NEGATIVE NEGATIVE    Ketones, Urine NEGATIVE NEGATIVE    Specific Gravity, UA 1.025 1.005 - 1.030    Urine Hgb NEGATIVE NEGATIVE    pH, UA 7.5 5.0 - 8.0    Protein, UA NEGATIVE NEGATIVE    Urobilinogen, Urine Normal Normal    Nitrite, Urine NEGATIVE NEGATIVE    Leukocyte Esterase, Urine NEGATIVE NEGATIVE    Urinalysis Comments       Microscopic exam not performed based on chemical results unless requested in original order. Imaging:   CT HEAD WO CONTRAST    Result Date: 1/22/2023  1. Moderate bifrontal and vertex atrophy versus less likely bilateral subdural hygromas in the bifrontal regions, right greater than left. Extra-axial space on the right measures 8 mm and measures 4 mm on the left. 2. No acute intracranial hemorrhage or midline shift. 3. Mild-to-moderate chronic small vessel ischemic white matter disease. 4. Mild ethmoid sinus disease. CT CERVICAL SPINE WO CONTRAST    Result Date: 1/22/2023  No acute abnormality of the cervical spine. XR CHEST PORTABLE    Result Date: 1/22/2023  Cardiomegaly and mild pulmonary vascular congestion. Small left-sided pleural effusion and left basilar airspace disease, atelectasis and/or infiltrate. CT CHEST PULMONARY EMBOLISM W CONTRAST    Result Date: 1/22/2023  1. Mild scattered opacities in the lungs could represent sequela of COVID-atypical viral pneumonia/residual pneumonia. Moderate emphysema. Mild dependent atelectasis, respiratory motion and parenchymal banding.  2. No clear evidence for central pulmonary embolus within the limitations of this study. 3. Coronary artery disease. Cardiomegaly. Atherosclerotic calcification of the aorta. ASSESSMENT & PLAN     ASSESSMENT / PLAN:     IMPRESSION  Principal Problem:    Syncope and collapse  Active Problems:    COPD (chronic obstructive pulmonary disease) (HCC)    Polymyalgia rheumatica (HCC)    Mixed hyperlipidemia  Resolved Problems:    * No resolved hospital problems. *    Syncope: Cardiogenic versus neurogenic. CT head showing moderate bifrontal and vertex atrophy versus bilateral subdural hygromas, mild to moderate chronic small vessel ischemic disease. CT PE negative for pulmonary emboli. Hypotensive on presentation. Will get echo. Cardiology has been consulted, will follow recommendations. COPD: Has a remote history of smoking. CT chest suggestive of moderate emphysema, scattered opacities likely sequelae of atypical viral pneumonia. Currently on 2 L via nasal cannula. Continue breathing treatments. Continue to wean down on oxygen. Polymyalgia rheumatica: Follows up with rheumatology, on prednisone 2.5 mg daily. Patient has been taking prednisone for close to 2 years now. Hypomagnesemia: Continue to replace as indicated. DVT ppx: Lovenox  GI ppx: Not indicated    PT/OT/SW: Consulted  Discharge Planning: In progress    Gwen Marcos MD  Internal Medicine Resident, PGY-1  [facility];  [place], [place]  1/22/2023, 2:43 PM

## 2023-01-22 NOTE — ED NOTES
Pt to ED via [facility]  EMS was called to pt's home for syncopal episode with fall, hitting head, +LOC  Small laceration noted above pt's right eyebrow   When EMS arrived they report that pt was a/ox4, NIH 0, VSS, pt wanted to not go to hospital [facility]  EMS states pt went bradycardic then asystole on monitor and unresponsive, awakened with sternal rub, became responsive again. EMS denies checking pt's pulse during unresponsive episode.    Pt is incontinant upon arrival   Takes multivitamins daily, denies any other daily medication intake   Denies any cardiac hx  Was recently admitted to [facility] for Covid/pnuemonia   Non productive cough noted   Pt is a/ox4, talking in full sentences, hard of hearing, attached to monitor, RR even and non labored, call light in reach        Francis Winter RN  01/22/23 0194

## 2023-01-22 NOTE — ED NOTES
2 L oxygen via nasal cannula placed for desat of 89% on room air, positive response of 97%     Mica Barlow RN  01/22/23 6642

## 2023-01-22 NOTE — ED NOTES
The following labs were labeled with appropriate pt sticker and tubed to lab:     [] Blue     [] Lavender   [] on ice  [x] Green/yellow  [] Green/black [] on ice  [] Ann Greener  [] on ice  [] Yellow  [] Red  [] Type/ Screen  [] ABG  [] VBG    [] COVID-19 swab    [] Rapid  [] PCR  [] Flu swab  [] Peds Viral Panel     [x] Urine Sample  [] Fecal Sample  [] Pelvic Cultures  [] Blood Cultures  [] X 2  [] STREP Cultures         Mor Claire RN  01/22/23 2409

## 2023-01-22 NOTE — ED NOTES
The following labs were labeled with appropriate pt sticker and tubed to lab:     [x] Blue     [x] Lavender   [] on ice  [x] Green/yellow  [x] Green/black [] on ice  [] Erroll Hoe  [] on ice  [] Yellow  [] Red  [] Type/ Screen  [] ABG  [] VBG    [] COVID-19 swab    [] Rapid  [] PCR  [] Flu swab  [] Peds Viral Panel     [] Urine Sample  [] Fecal Sample  [] Pelvic Cultures  [] Blood Cultures  [] X 2  [] STREP Cultures         Jeremías Garrido RN  01/22/23 5653

## 2023-01-23 LAB
ABSOLUTE EOS #: 0.03 K/UL (ref 0–0.4)
ABSOLUTE IMMATURE GRANULOCYTE: 0.25 K/UL (ref 0–0.3)
ABSOLUTE LYMPH #: 0.2 K/UL (ref 1–4.8)
ABSOLUTE MONO #: 0.78 K/UL (ref 0.1–0.8)
ANION GAP SERPL CALCULATED.3IONS-SCNC: 8 MMOL/L (ref 9–17)
BASOPHILS # BLD: 0 % (ref 0–2)
BASOPHILS ABSOLUTE: 0 K/UL (ref 0–0.2)
BUN BLDV-MCNC: 17 MG/DL (ref 8–23)
CALCIUM SERPL-MCNC: 8.3 MG/DL (ref 8.6–10.4)
CHLORIDE BLD-SCNC: 103 MMOL/L (ref 98–107)
CO2: 21 MMOL/L (ref 20–31)
CREAT SERPL-MCNC: 0.65 MG/DL (ref 0.7–1.2)
EOSINOPHILS RELATIVE PERCENT: 1 % (ref 1–4)
FERRITIN: 1173 NG/ML (ref 30–400)
GFR SERPL CREATININE-BSD FRML MDRD: >60 ML/MIN/1.73M2
GLUCOSE BLD-MCNC: 139 MG/DL (ref 70–99)
HCT VFR BLD CALC: 30.1 % (ref 40.7–50.3)
HEMOGLOBIN: 9.8 G/DL (ref 13–17)
IMMATURE GRANULOCYTES: 10 %
IRON SATURATION: 14 % (ref 20–55)
IRON: 23 UG/DL (ref 59–158)
LYMPHOCYTES # BLD: 8 % (ref 24–44)
MCH RBC QN AUTO: 36.4 PG (ref 25.2–33.5)
MCHC RBC AUTO-ENTMCNC: 32.6 G/DL (ref 28.4–34.8)
MCV RBC AUTO: 111.9 FL (ref 82.6–102.9)
MONOCYTES # BLD: 31 % (ref 1–7)
MORPHOLOGY: ABNORMAL
MORPHOLOGY: ABNORMAL
NRBC AUTOMATED: 0 PER 100 WBC
PDW BLD-RTO: 13.2 % (ref 11.8–14.4)
PLATELET # BLD: 112 K/UL (ref 138–453)
PMV BLD AUTO: 12.8 FL (ref 8.1–13.5)
POTASSIUM SERPL-SCNC: 4.7 MMOL/L (ref 3.7–5.3)
RBC # BLD: 2.69 M/UL (ref 4.21–5.77)
SEG NEUTROPHILS: 50 % (ref 36–66)
SEGMENTED NEUTROPHILS ABSOLUTE COUNT: 1.24 K/UL (ref 1.8–7.7)
SODIUM BLD-SCNC: 132 MMOL/L (ref 135–144)
TOTAL IRON BINDING CAPACITY: 164 UG/DL (ref 250–450)
UNSATURATED IRON BINDING CAPACITY: 141 UG/DL (ref 112–347)
WBC # BLD: 2.5 K/UL (ref 3.5–11.3)

## 2023-01-23 PROCEDURE — 97535 SELF CARE MNGMENT TRAINING: CPT

## 2023-01-23 PROCEDURE — 83550 IRON BINDING TEST: CPT

## 2023-01-23 PROCEDURE — 36415 COLL VENOUS BLD VENIPUNCTURE: CPT

## 2023-01-23 PROCEDURE — 85025 COMPLETE CBC W/AUTO DIFF WBC: CPT

## 2023-01-23 PROCEDURE — 6370000000 HC RX 637 (ALT 250 FOR IP)

## 2023-01-23 PROCEDURE — 99232 SBSQ HOSP IP/OBS MODERATE 35: CPT | Performed by: INTERNAL MEDICINE

## 2023-01-23 PROCEDURE — 82728 ASSAY OF FERRITIN: CPT

## 2023-01-23 PROCEDURE — 6360000002 HC RX W HCPCS

## 2023-01-23 PROCEDURE — 99231 SBSQ HOSP IP/OBS SF/LOW 25: CPT | Performed by: PSYCHIATRY & NEUROLOGY

## 2023-01-23 PROCEDURE — 97116 GAIT TRAINING THERAPY: CPT

## 2023-01-23 PROCEDURE — 2060000000 HC ICU INTERMEDIATE R&B

## 2023-01-23 PROCEDURE — 2580000003 HC RX 258: Performed by: INTERNAL MEDICINE

## 2023-01-23 PROCEDURE — 80048 BASIC METABOLIC PNL TOTAL CA: CPT

## 2023-01-23 PROCEDURE — 97162 PT EVAL MOD COMPLEX 30 MIN: CPT

## 2023-01-23 PROCEDURE — 97165 OT EVAL LOW COMPLEX 30 MIN: CPT

## 2023-01-23 PROCEDURE — 83540 ASSAY OF IRON: CPT

## 2023-01-23 PROCEDURE — 2580000003 HC RX 258

## 2023-01-23 RX ORDER — TRAZODONE HYDROCHLORIDE 50 MG/1
50 TABLET ORAL ONCE
Status: COMPLETED | OUTPATIENT
Start: 2023-01-23 | End: 2023-01-23

## 2023-01-23 RX ORDER — PREDNISONE 1 MG/1
2.5 TABLET ORAL DAILY
Status: DISCONTINUED | OUTPATIENT
Start: 2023-01-25 | End: 2023-01-25 | Stop reason: HOSPADM

## 2023-01-23 RX ADMIN — DEXAMETHASONE 4 MG: 4 TABLET ORAL at 09:51

## 2023-01-23 RX ADMIN — TRAZODONE HYDROCHLORIDE 50 MG: 100 TABLET ORAL at 20:56

## 2023-01-23 RX ADMIN — DEXAMETHASONE 4 MG: 4 TABLET ORAL at 00:35

## 2023-01-23 RX ADMIN — SODIUM CHLORIDE, PRESERVATIVE FREE 10 ML: 5 INJECTION INTRAVENOUS at 20:57

## 2023-01-23 RX ADMIN — SODIUM CHLORIDE: 9 INJECTION, SOLUTION INTRAVENOUS at 09:32

## 2023-01-23 RX ADMIN — AZITHROMYCIN 250 MG: 250 TABLET, FILM COATED ORAL at 09:51

## 2023-01-23 RX ADMIN — ENOXAPARIN SODIUM 40 MG: 100 INJECTION SUBCUTANEOUS at 09:51

## 2023-01-23 RX ADMIN — DEXAMETHASONE 4 MG: 4 TABLET ORAL at 20:56

## 2023-01-23 ASSESSMENT — PAIN SCALES - GENERAL
PAINLEVEL_OUTOF10: 0
PAINLEVEL_OUTOF10: 0

## 2023-01-23 NOTE — PROGRESS NOTES
Physical Therapy  Facility/Department: [facility] CAR 2- STEPDOWN  Physical Therapy Initial Assessment    Name: Armando Smith  : 1945  MRN: [id number]  Date of Service: 2023  Chief Complaint   Patient presents with    Dizziness    Syncope and collapse further workup pending. Discharge Recommendations:  Patient would benefit from continued therapy after discharge   PT Equipment Recommendations  Equipment Needed: No      Patient Diagnosis(es): The encounter diagnosis was Syncope and collapse. Past Medical History:  has a past medical history of Hyperlipidemia. Past Surgical History:  has a past surgical history that includes Vasectomy. Assessment   Assessment: Pt presents with baseline general strength, balance and gait initiation. Pt will continue to benefit from PT to progress activity as indicated and tolerated to prevent functional decline due to recent inactivity  Therapy Prognosis: Good  Decision Making: Medium Complexity  Clinical Presentation: evolving  Requires PT Follow-Up: Yes  Activity Tolerance  Activity Tolerance: Patient tolerated evaluation without incident     Plan   Physcial Therapy Plan  General Plan:  (5-6 x/wk)  Current Treatment Recommendations: Strengthening, Balance training, Functional mobility training, Transfer training, Therapeutic activities  Safety Devices  Type of Devices: Left in bed, Call light within reach, Nurse notified  Restraints  Restraints Initially in Place: No     Restrictions  Restrictions/Precautions  Required Braces or Orthoses?: No  Position Activity Restriction  Other position/activity restrictions: up with assist, Droplet + precautions     Subjective   General  Patient assessed for rehabilitation services?: Yes  Additional Pertinent Hx: syncope and collapse  Response To Previous Treatment: Not applicable  Follows Commands: Within Functional Limits  Other (Comment): Pt awalke and alert resting in bed, alyssa hose in place in droplet + isolation.  Pt in agreement with PT evaluation  General Comment  Comments: RN in agreement with PT intervention  Subjective  Subjective: Pt report no pain or discomfort         Social/Functional History  Social/Functional History  Lives With: Spouse  Type of Home: House  Home Layout: One level  Home Access: Stairs to enter with rails  Entrance Stairs - Number of Steps: 4  Entrance Stairs - Rails: Left  Bathroom Shower/Tub: Tub/Shower unit, Walk-in shower  Bathroom Toilet: Handicap height  Bathroom Equipment: Grab bars in shower, Built-in shower seat  Bathroom Accessibility: Accessible  Home Equipment: Jozef Clunes, rolling (does not use at baseline)  Has the patient had two or more falls in the past year or any fall with injury in the past year?: No  Receives Help From: Family  ADL Assistance: [address] [place] [address] Avenue: Independent  Homemaking Responsibilities: Yes  Meal Prep Responsibility: Primary  Laundry Responsibility: Secondary  Cleaning Responsibility: Primary  Bill Paying/Finance Responsibility: Primary  Shopping Responsibility: Primary  Dependent Care Responsibility: Primary  Health Care Management: Primary  Ambulation Assistance: Independent  Transfer Assistance: Independent  Active : Yes  Mode of Transportation: Car, SUV  Occupation: Retired  Type of Occupation: aircraft industry, 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, [place], cooking  Additional Comments: Pt report independent at baseline no DME needs ,  spouse able to support as indicated  Vision/Hearing  Vision  Vision: Impaired  Vision Exceptions: Wears glasses for distance  Tracking: Able to track stimulus in all quads w/o difficulty  Hearing  Hearing: Within functional limits    Cognition   Orientation  Overall Orientation Status: Within Functional Limits  Cognition  Overall Cognitive Status: WFL     Objective   Heart Rate: 77  Heart Rate Source: Monitor  BP: 110/72  MAP (Calculated): 85  Resp: 18  SpO2: 96 %  O2 Device: Nasal cannula Observation/Palpation  Posture: Good  Gross Assessment  AROM: Within functional limits  PROM: Within functional limits  Strength: Within functional limits  Coordination: Within functional limits  Tone: Normal  Sensation: Intact     AROM RLE (degrees)  RLE AROM: WFL  AROM LLE (degrees)  LLE AROM : WFL  AROM RUE (degrees)  RUE AROM : WFL  AROM LUE (degrees)  LUE AROM : WFL  Strength RLE  Strength RLE: WFL  Strength LLE  Strength LLE: WFL  Strength RUE  Strength RUE: WFL  Strength LUE  Strength LUE: WFL        Bed Mobility Training  Bed Mobility Training: Yes  Overall Level of Assistance: Independent  Supine to Sit: Independent  Sit to Supine: Independent  Scooting: Independent  Balance  Sitting: Intact (IND EOB)  Standing: Intact (Good dynamic/static standing balance without device)  Transfer Training  Transfer Training: Yes  Overall Level of Assistance: Supervision (supervision d/t syncope diagnosis)  Sit to Stand: Supervision  Stand to Sit: Supervision  Gait  Overall Level of Assistance: Supervision (supervision d/t syncope diagnosis only; no device; at bedside; distance limited per RN request d/t pending cardiac workup)  Bed mobility  Bridging: Independent  Rolling to Left: Independent  Rolling to Right: Independent  Supine to Sit: Independent  Sit to Supine: Independent  Scooting: Independent  Transfers  Sit to Stand: Contact guard assistance  Stand to Sit: Contact guard assistance  Bed to Chair: Contact guard assistance  Comment: CGA for safety due to syncope history with furhter management pending  Ambulation  Surface: Level tile  Device: No Device  Assistance: Stand by assistance  Quality of Gait: Pt able ambulate with normal pattern, up with nursing to bathroom  Gait Deviations: None  Distance: at side of bed ~ 12 ft, normal initiation and tolerance.   Comments: Pt report baseline mobilty, furhter distance deferred due to sudden onset syncope with furhter test/ managent/ plan pending  More Ambulation?: No  Stairs/Curb  Stairs?: No  Wheelchair Activities  Wheelchair Parts Management: No  Propulsion: No     Balance  Posture: Good  Sitting - Static: Good  Sitting - Dynamic: Good  Standing - Static: Good  Standing - Dynamic: Good                                                         AM-PAC Score  AM-PAC Inpatient Mobility Raw Score : 22 (01/23/23 1604)  AM-PAC Inpatient T-Scale Score : 53.28 (01/23/23 1604)  Mobility Inpatient CMS 0-100% Score: 20.91 (01/23/23 1604)  Mobility Inpatient CMS G-Code Modifier : CJ (01/23/23 1604)               Goals  Short Term Goals  Time Frame for Short Term Goals: 10 visits  Short Term Goal 1: pt to ambulate 150 ft SBA no AD  Short Term Goal 2: ascend/descend 4 steps SBA right railing  Short Term Goal 3: transfer alternat height surfaces ind  Short Term Goal 4: Pt to tolerate 30 min ther ex/act to improve to[place]rance  Patient Goals   Patient Goals : to return home       Education  Patient Education  Education Given To: Patient  Education Provided: Role of Therapy;Plan of Care;Precautions;Transfer Training; Fall Prevention Strategies  Education Method: Demonstration  Barriers to Learning: None  Education Outcome: Verbalized understanding      Therapy Time   Individual Concurrent Group Co-treatment   Time In 1521         Time Out 1540         Minutes 19         Timed Code Treatment Minutes: 8 Minutes (Co Tx wit OT)       Chana Flores, PT, DPT

## 2023-01-23 NOTE — PROGRESS NOTES
[id number] [facility] Neurology   IN-PATIENT SERVICE      NEUROLOGY PROGRESS  NOTE            Date:   1/23/2023  Patient name:  Shoshana Walters  Date of admission:  1/22/2023  YOB: 1945      Interval History:     Since evaluation yesterday the patient has awoken. He appears to be at baseline. Yesterday he was alert and oriented with intact memory and no confusion. Speech and language appear to be intact. No hallucinations or delusions. He had a normal neurologic examination. History of Present Illness: The patient is a 76 y.o. male who presents with Dizziness  . The patient was seen and examined and the chart was reviewed. This patient was seen by Dr. Juan Oneil yesterday. Is a 17-year-old man admitted on 1/22/2023. On the morning of admission he was standing in his kitchen and apparently passed out. He recalls waking up on the floor. His wife tried to call EMS. EMS arrived and noted an episode of asystole. Patient denies having prior symptoms. Patient was admitted to [facility] for COVID-19 infection left lower lobe infiltrate. Patient has a history of polymyalgia rheumatica for which she has been on prednisone for the past 2 years. On arrival here he was hypotensive with a blood pressure of 96/56 and was hypoxic requiring nasal O2. CT of the head demonstrated moderate bifrontal and vertex atrophy with bilateral subdural hygromas. Subdural hygromas were judged to be likely asymptomatic. Patient was also noted to have thrombocytopenia and leukopenia. This is currently being managed by attending physician.     Past Medical History:     Past Medical History:   Diagnosis Date    Hyperlipidemia         Past Surgical History:     Past Surgical History:   Procedure Laterality Date    VASECTOMY          Medications during admission:      sodium chloride flush  5-40 mL IntraVENous 2 times per day    enoxaparin  40 mg SubCUTAneous Daily    dexamethasone  4 mg Oral 2 times per day azithromycin  250 mg Oral Daily         Physical Exam:   BP (!) 121/95   Pulse 72   Temp 97.7 °F (36.5 °C) (Oral)   Resp 16   Ht 6' (1.829 m)   Wt 204 lb 5.9 oz (92.7 kg)   SpO2 97%   BMI 27.72 kg/m²   Temp (24hrs), Av.1 °F (36.7 °C), Min:97.7 °F (36.5 °C), Max:98.6 °F (37 °C)      Neurological examination:    Mental status   Alert and oriented x 3; following all commands; speech is fluent, no dysarthria, aphasia. Language shows no reception expression repetition naming. Cranial nerves   II - visual fields intact to confrontation; pupils reactive  III, IV, VI - extraocular muscles intact; no BESSIE; no nystagmus; no ptosis   V - normal facial sensation                                                               VII - normal facial symmetry                                                             VIII -he is hearing impaired and wears hearing aids bilaterally. O2 flowing through his nasal cannula seems to cause some interference in his hearing but is otherwise intact. IX, X -normal phonation                                         XI -normal head turning                                                    XII - midline tongue     Motor function  Strength: 5/5 RUE, 5/5 RLE, 5/5 LUE, 5/5  LLE  Normal bulk and tone. No tremors                      Sensory function Intact to touch throughout     Cerebellar Intact finger-nose-finger testing. Intact heel-shin testing. No dysdiadochokinesia present. Reflex function 2/4 symmetric throughout . Downgoing plantar response bilaterally.     Gait                  Deferred at this time             Diagnostics:      Laboratory Testing:  CBC:   Recent Labs     23  1057 23  0657   WBC 4.6 2.5*   HGB 10.8* 9.8*   PLT See Reflexed IPF Result 112*     BMP:    Recent Labs     23  1044 23  1057 23  0657   NA  --  134* 132*   K  --  3.9 4.7   CL  --  99 103   CO2  -- 22 21   BUN  --  20 17   CREATININE 1.03 0.84 0.65*   GLUCOSE  --  112* 139*         No results found for: CHOL, LDLCHOLESTEROL, HDL, TRIG, ALT, AST, TSH, INR, GLUF, LABA1C, LABMICR, [id number]    No results found for: PHENYTOIN, PHENYTOIN, VALPROATE, CBMZ      Impression:      Syncope. This appears to be cardiogenic in nature. Normal neuro exam.    Plan:     Of discontinued EEG. Patient has no history of seizures and did not have any seizure-like event. Neurology will sign off. Please reconsult as needed.         Electronically signed by Richa Hook MD on 1/23/2023 at 9:24 AM      Richa Hook MD  [facility]  Neurology

## 2023-01-23 NOTE — PROGRESS NOTES
Occupational Therapy  Facility/Department: [facility] CAR 2- STEPDOWN  Occupational Therapy Initial Assessment    Name: Cynthia Higginbotham  : 1945  MRN: [id number]  Date of Service: 2023    The patient is a 76 y.o. male who presents with Dizziness  . The patient was seen and examined and the chart was reviewed. This patient was seen by Dr. Radha Monahan yesterday. Is a 66-year-old man admitted on 2023. On the morning of admission he was standing in his kitchen and apparently passed out. He recalls waking up on the floor. His wife tried to call EMS. EMS arrived and noted an episode of asystole. Patient denies having prior symptoms. Patient was admitted to [facility] for COVID-19 infection left lower lobe infiltrate. Patient has a history of polymyalgia rheumatica for which she has been on prednisone for the past 2 years. On arrival here he was hypotensive with a blood pressure of 96/56 and was hypoxic requiring nasal O2. CT of the head demonstrated moderate bifrontal and vertex atrophy with bilateral subdural hygromas. Subdural hygromas were judged to be likely asymptomatic. Patient was also noted to have thrombocytopenia and leukopenia. This is currently being managed by attending physician. Discharge Recommendations:  No therapy recommended at discharge. OT Equipment Recommendations  Equipment Needed: No       Patient Diagnosis(es): The encounter diagnosis was Syncope and collapse. Past Medical History:  has a past medical history of Hyperlipidemia. Past Surgical History:  has a past surgical history that includes Vasectomy. Assessment   Assessment: Pt BUE ROM/strength/Coordination WFL for ADL tasks. Pt demo Good dynamic/static standing and sitting balance throughout session. pt limited by percautions from syncope diagnosis only requiring Supervision during mobility. No further OT recommended at this time.  Please reconsult if pt status changes  Prognosis: Good  Decision Making: Low Complexity  No Skilled OT: Independent with ADL's;No OT goals identified; At baseline function  REQUIRES OT FOLLOW-UP: No  Activity Tolerance  Activity Tolerance: Patient Tolerated treatment well;Treatment limited secondary to medical complications (free text)  Activity Tolerance Comments: pt limited by percautions from syncope diagnosis only          Restrictions  Restrictions/Precautions  Required Braces or Orthoses?: No  Position Activity Restriction  Other position/activity restrictions: up with assist    Subjective   General  Patient assessed for rehabilitation services?: Yes  Family / Caregiver Present: No  General Comment  Comments: RN okayed for therapy. pt agreeable to therapy and cooperative throughout evaluation.  pt denies pain     Social/Functional History  Social/Functional History  Lives With: Spouse  Type of Home: House  Home Layout: One level  Home Access: Stairs to enter with rails  Entrance Stairs - Number of Steps: 4  Entrance Stairs - Rails: Left  Bathroom Shower/Tub: Tub/Shower unit, Walk-in shower  Bathroom Toilet: Handicap height  Bathroom Equipment: Grab bars in shower, Built-in shower seat  Bathroom Accessibility: Accessible  Home Equipment: deborah Harris (does not use at baseline)  Has the patient had two or more falls in the past year or any fall with injury in the past year?: No  Receives Help From: Family  ADL Assistance: [address]: Independent  Homemaking Responsibilities: Yes  Meal Prep Responsibility: Primary  Laundry Responsibility: Secondary  Cleaning Responsibility: Primary  Bill Paying/Finance Responsibility: Primary  Shopping Responsibility: Primary  Dependent Care Responsibility: Primary  Health Care Management: Primary  Ambulation Assistance: Independent  Transfer Assistance: Independent  Active : Yes  Mode of Transportation: Car, SUV  Occupation: Retired  Type of Occupation: aircraft industry, retired since 2001  Leisure & Hobbies: Golf, casino, cooking  Additional Comments: Pt report independent at baseline no DME needs ,  spouse able to support as indicated       Objective     Safety Devices  Type of Devices: Left in bed;Call light within reach;Nurse notified  Restraints  Restraints Initially in Place: No    Bed Mobility Training  Bed Mobility Training: Yes  Overall Level of Assistance: Independent  Supine to Sit: Independent  Sit to Supine: Independent  Scooting: Independent    Balance  Sitting: Intact (IND EOB)  Standing: Intact (Good dynamic/static standing balance without device)    Transfer Training  Transfer Training: Yes  Overall Level of Assistance: Supervision (supervision d/t syncope diagnosis)  Sit to Stand: Supervision  Stand to Sit: Supervision    Gait  Overall Level of Assistance: Supervision (supervision d/t syncope diagnosis only; no device; at bedside; distance limited per RN request d/t pending cardiac workup)     AROM: Within functional limits  Strength: Within functional limits (5/5 BUE grossly)  Coordination: Within functional limits  Tone: Normal  Sensation: Intact    ADL  Feeding: Independent  Grooming: Independent  UE Bathing: Independent  LE Bathing: Independent  UE Dressing: Independent  LE Dressing: Independent  Toileting: Independent  Additional Comments: Pt BUE ROM/strength/Coordination WFL for ADL tasks. Pt demo Good dynamic/static standing and sitting balance throughout session.               Vision  Vision: Impaired  Vision Exceptions: Wears glasses for distance  Tracking: Able to track stimulus in all quads w/o difficulty  Hearing  Hearing: Exceptions to [facility]  Hearing Exceptions: Hard of hearing/hearing concerns;Bilateral hearing aid  Cognition  Overall Cognitive Status: WFL  Orientation  Overall Orientation Status: Within Functional Limits                  Education Given To: Patient  Education Provided: Role of Therapy;Plan of Care;Precautions  Education Method: Verbal  Barriers to Learning: None  Education Outcome: Verbalized understanding      AM-PAC Score        AM-PAC Inpatient Daily Activity Raw Score: 24 (01/23/23 1555)  AM-PAC Inpatient ADL T-Scale Score : 57.54 (01/23/23 1555)  ADL Inpatient CMS 0-100% Score: 0 (01/23/23 1555)  ADL Inpatient CMS G-Code Modifier : [address] (01/23/23 1555)      Therapy Time   Individual Concurrent Group Co-treatment   Time In 9516         Time Out 1539         Minutes 17      Co-eval w/PT   Timed Code Treatment Minutes: [address] [facility], OTR/L

## 2023-01-23 NOTE — CONSULTS
[facility], [address], [address] [place] [address]  Ph: [phone] or [phone]  FAX: [phone]        Reason for consult: Syncope    I had the pleasure of seeing your patient in neurology consultation for his symptoms. As you would recall Hortensia Light is a 76 y.o. yo male admitted on 1/22/2023. The history is obtained from patient medical records. The patient states that he woke up at his baseline this morning when while standing in the kitchen, he passed out. His next recollection is waking up on the floor and wife tried to call EMS. Upon arrival of EMS, the patient had an episode of asystole. Patient denies having any previous similar symptoms. He recently was admitted in [facility] for COVID-19 infection and left lower lobe infiltrate. The patient has a past medical history of polymyalgia rheumatica for which he is on prednisone for last 2 years. Upon arrival, the patient was hypotensive with systolic blood pressure 01/24, hypoxic requiring nasal cannula. CT scan of the head showed moderate bifrontal and vertex atrophy and bilateral subdural hygroma  Past Medical History:   Diagnosis Date    Hyperlipidemia      Past Surgical History:   Procedure Laterality Date    VASECTOMY       Social History     Socioeconomic History    Marital status:      Spouse name: Not on file    Number of children: Not on file    Years of education: Not on file    Highest education level: Not on file   Occupational History    Not on file   Tobacco Use    Smoking status: Former     Packs/day: 0.25     Years: 50.00     Pack years: 12.50     Types: Cigarettes     Quit date: 7/11/2007     Years since quitting: 15.5    Smokeless tobacco: Never   Substance and Sexual Activity    Alcohol use:  Yes     Alcohol/week: 2.0 standard drinks     Types: 2 Shots of liquor per week    Drug use: No    Sexual activity: Not on file   Other Topics Concern    Not on file   Social History Narrative    Not on file     Social Determinants of Health     Financial Resource Strain: Not on file   Food Insecurity: Not on file   Transportation Needs: Not on file   Physical Activity: Not on file   Stress: Not on file   Social Connections: Not on file   Intimate Partner Violence: Not on file   Housing Stability: Not on file     Family History   Problem Relation Age of Onset    Cancer Mother 62        lung    Diabetes Maternal Uncle       Allergies   Allergen Reactions    Doxycycline Hives    Oxycodone-Acetaminophen Hives    Pcn [Penicillins] Hives    Pneumococcal Vaccines Hives      /85   Pulse 81   Temp 98.2 °F (36.8 °C) (Oral)   Resp 16   Ht 6' (1.829 m)   Wt 204 lb 5.9 oz (92.7 kg)   SpO2 99%   BMI 27.72 kg/m²      ROS:  Constitutional Negative for fever and chills   HEENT Negative for ear discharge, ear pain, nosebleed   Eyes Negative for photophobia, pain and discharge   Respiratory Negative for hemoptysis and sputum   Cardiovascular Negative for orthopnea, claudication and PND   Gastrointestinal Negative for abdominal pain, diarrhea, blood in stool   Musculoskeletal Negative for joint pain, negative for myalgia   Skin Negative for rash or itching   Endo/heme/allergies Negative for polydipsia, environmental allergy   Psychiatric/behavioral Negative for suicidal ideation.   Patient is not anxious   General examination:    Head: Normocephalic, atraumatic  Eyes: Extraocular movements intact  Lungs: Respirations unlabored, chest wall no deformity  ENT: Normal external ear canals, no sinus tenderness  Heart: Regular rate rhythm  Abdomen: No masses, tenderness  Extremities: No cyanosis or edema, 2+ pulses  Skin: Intact, normal skin color    Neurological examination:    Mental status   Alert and oriented; intact memory with no confusion, speech or language problems; no hallucinations or delusions     Cranial nerves   II - visual fields intact to confrontation III, IV, VI - extra-ocular muscles full: no pupillary defect; no BESSIE, no nystagmus, no ptosis                                                                      V - normal facial sensation                                                               VII - normal facial symmetry                                                             VIII - intact hearing                                                                             IX, X - symmetrical palate                                                                  XI - symmetrical shoulder shrug                                                       XII - midline tongue without atrophy or fasciculation     Motor function  Normal muscle bulk and tone; normal power 5/5, including fine motor movements     Sensory function Intact to touch, pin, vibration, proprioception     Cerebellar Intact fine motor movement. No involuntary movements or tremors     Reflex function Intact 2+ DTR and symmetric. Negative Babinski     Gait                  Not tested       No results found for: LDLCALC, LDLCHOLESTEROL, LDLDIRECT   No components found for: CHLPL   No results found for: TRIG   No results found for: HDL   No results found for: LDLCALC   No results found for: LABVLDL   No results found for: LABA1C   No results found for: EAG   No results found for: [id number]     Neurological work up:  Study Finding   CT head    CTA Head and neck    MRI brain    2 D Echo    AHSAN    EEG      Assessment and Recommendations:   Syncope likely cardiogenic  Bifrontal hygromas, chronic finding, likely asymptomatic  Patient with COPD, polymyalgia rheumatica and recent COVID-19 infection  Patient's symptoms are rather suggestive of cardiogenic syncope/hypotension associated syncope. However given finding of subdural hygroma, I will obtain EEG brain rule out epileptiform activity. Will follow. Thank you for the consult.     Doug Porter MD  Neurology    This note is created with the assistance of a speech-recognition program. While intending to generate a document that actually reflects the content of the visit, the document can still have some errors including those of syntax and sound a- like substitutions which may escape proofreading. In such instances, actual meaning can be extrapolated by contextual derivation.

## 2023-01-23 NOTE — PLAN OF CARE
Problem: Discharge Planning  Goal: Discharge to home or other facility with appropriate resources  Outcome: Progressing  Flowsheets (Taken 1/22/2023 2000)  Discharge to home or other facility with appropriate resources:   Identify barriers to discharge with patient and caregiver   Arrange for needed discharge resources and transportation as appropriate   Identify discharge learning needs (meds, wound care, etc)     Problem: Pain  Goal: Verbalizes/displays adequate comfort level or baseline comfort level  Outcome: Progressing

## 2023-01-23 NOTE — PROGRESS NOTES
Comprehensive Nutrition Assessment    Type and Reason for Visit:  Initial, Positive Nutrition Screen (Weight Loss; Poor Intakes/Appetite)    Nutrition Recommendations/Plan:   Continue current diet. Encourage/monitor PO intakes as tolerated. Monitor need for oral supplements. Will monitor labs, weights, and plan of care. Malnutrition Assessment:  Malnutrition Status:  Insufficient data (01/23/23 1235)    Context:  Acute Illness     Findings of the 6 clinical characteristics of malnutrition:  Energy Intake:  75% or less of estimated energy requirements for 7 or more days  Weight Loss:  No significant weight loss     Body Fat Loss:  Unable to assess   Muscle Mass Loss:  Unable to assess  Fluid Accumulation:  No significant fluid accumulation   Strength:  Not Performed    Nutrition Assessment:    Pt admitted s/p syncope and collapse. PMH: polymyalgia rheumatica on prednisone. Noted pt recently discharged from outside hospital after being admitted for COVID- pneumonia. Staff in room with pt at attempted visits. RN reports pt has a good appetite and ate well for breakfast.  Will continue to monitor PO intakes. Weight fluctuations noted with 4.7% weight loss x 6 months (not significant). Will monitor need for oral supplements. Labs reviewed: Na 132 mmol/L. Meds include: Decadron, Prednisone. Nutrition Related Findings:    Labs/Meds reviewed. Wound Type:  (Abrasion above right eye brow)       Current Nutrition Intake & Therapies:    Average Meal Intake: 51-75%  Average Supplements Intake: None Ordered  ADULT DIET; Regular    Anthropometric Measures:  Height: 6' (182.9 cm)  Ideal Body Weight (IBW): 178 lbs (81 kg)    Admission Body Weight: 204 lb 5.9 oz (92.7 kg)  Current Body Weight: 204 lb 5.9 oz (92.7 kg), 114.8 % IBW.  Weight Source: Bed Scale  Current BMI (kg/m2): 27.7  Usual Body Weight: 214 lb 6.4 oz (97.3 kg) (7/12/22 bed scale per chart review)  % Weight Change (Calculated): -4.7 BMI Categories: Overweight (BMI 25.0-29. 9)    Estimated Daily Nutrient Needs:  Energy Requirements Based On: Kcal/kg  Weight Used for Energy Requirements: Admission  Energy (kcal/day): 7298-2648 kcals/day  Weight Used for Protein Requirements: Ideal  Protein (g/day):  gm pro/day  Method Used for Fluid Requirements: 1 ml/kcal  Fluid (ml/day): 0513-5084 mL/day or per MD    Nutrition Diagnosis:   Inadequate oral intake related to  (current condition) as evidenced by poor intake prior to admission (h/o variable PO intakes)    Nutrition Interventions:   Food and/or Nutrient Delivery: Continue Current Diet (Monitor need for ONS.)  Nutrition Education/Counseling: No recommendation at this time  Coordination of Nutrition Care: Continue to monitor while inpatient  Plan of Care discussed with: RN    Goals:  Previous Goal Met:  (Goal Set)  Goals: PO intake 75% or greater, prior to discharge       Nutrition Monitoring and Evaluation:   Behavioral-Environmental Outcomes: None Identified  Food/Nutrient Intake Outcomes: Food and Nutrient Intake  Physical Signs/Symptoms Outcomes: Biochemical Data, GI Status, Fluid Status or Edema, Nutrition Focused Physical Findings, Skin, Weight    Discharge Planning:     Too soon to determine     Leah Fitzpatrick RD, LD  Contact: [phone]

## 2023-01-23 NOTE — PROGRESS NOTES
[facility]  Internal Medicine Teaching Residency Program  Inpatient Daily Progress Note  ______________________________________________________________________________    Patient: Richelle Bass  YOB: 1945   YEK:[id number]    Acct: [de-identified]     Room: 2027/2027-01  Admit date: 1/22/2023  Today's date: 01/23/23  Number of days in the hospital: 1    SUBJECTIVE   Admitting Diagnosis: Syncope and collapse  CC: Syncope  Pt examined at bedside. Chart & results reviewed. No acute events overnight. Vital signs stable with /95, heart rate 72 vomiting saturation on 4 L. No complaints of any dizziness, lightheadedness, chest pain, nausea vomiting. Continue IV fluids. ROS:  Constitutional:  negative for chills, fevers, sweats  Respiratory:  negative for cough, dyspnea on exertion, hemoptysis, shortness of breath, wheezing  Cardiovascular:  negative for chest pain, chest pressure/discomfort, lower extremity edema, palpitations  Gastrointestinal:  negative for abdominal pain, constipation, diarrhea, nausea, vomiting  Neurological:  negative for dizziness, headache  BRIEF HISTORY     The patient is a pleasant 76 y.o. male presents with a chief complaint of syncope. He has a past medical history of Polymyalgia rheumatica on prednisone, GERD, pancytopenia, epiglottitis, hearing loss, hyperlipidemia. According to wife patient was standing near the bar top when he had an episode of syncope. Patient does not remember passing out, he did hit his head and was incontinent of urine. The patient then gradually regained consciousness. Wife called the EMS who brought the patient to the hospital.  According to the EMS the patient had 1 episode of asystole but the patient woke up and became alert on his own. Denies any chest pain, lightheadedness, dizziness, nausea, vomiting.      Patient was recently admitted to [facility] for COVID-19 infection with left lower lobe infiltrate. CT scan showed concerns of epiglottitis. Patient was discharged on Keflex and Decadron. He has history of polymyalgia rheumatica and has been on prednisone for 2 years. On arrival patient was hypoxic, BP 96/56, was started on 2 L via nasal cannula    OBJECTIVE     Vital Signs:  /60   Pulse 72   Temp 97.7 °F (36.5 °C) (Oral)   Resp 16   Ht 6' (1.829 m)   Wt 204 lb 5.9 oz (92.7 kg)   SpO2 97%   BMI 27.72 kg/m²     Temp (24hrs), Av.1 °F (36.7 °C), Min:97.7 °F (36.5 °C), Max:98.6 °F (37 °C)    In: 500   Out: 1050 [Urine:1050]    Physical Exam  Vitals reviewed: Orthostats positive. Constitutional:       General: He is not in acute distress. Appearance: Normal appearance. He is not ill-appearing. HENT:      Head: Normocephalic. Nose: Nose normal.      Mouth/Throat:      Mouth: Mucous membranes are moist.   Eyes:      Conjunctiva/sclera: Conjunctivae normal.      Pupils: Pupils are equal, round, and reactive to light. Cardiovascular:      Rate and Rhythm: Normal rate and regular rhythm. Pulses: Normal pulses. Heart sounds: No murmur heard. Pulmonary:      Effort: Pulmonary effort is normal. No respiratory distress. Breath sounds: Normal breath sounds. No wheezing, rhonchi or rales. Abdominal:      General: Bowel sounds are normal. There is no distension. Palpations: Abdomen is soft. Tenderness: There is no abdominal tenderness. Musculoskeletal:         General: No tenderness or deformity. Normal range of motion. Cervical back: Normal range of motion and neck supple. Skin:     Capillary Refill: Capillary refill takes less than 2 seconds. Findings: Lesion (Right eyebrow) present. No erythema. Neurological:      General: No focal deficit present. Mental Status: He is alert. Mental status is at baseline.    Psychiatric:         Mood and Affect: Mood normal.         Behavior: Behavior normal. Medications:  Scheduled Medications:    sodium chloride flush  5-40 mL IntraVENous 2 times per day    enoxaparin  40 mg SubCUTAneous Daily    dexamethasone  4 mg Oral 2 times per day    azithromycin  250 mg Oral Daily     Continuous Infusions:    sodium chloride      sodium chloride 50 mL/hr at 01/22/23 2123     PRN Medicationssodium chloride flush, 5-40 mL, PRN  sodium chloride, , PRN  ondansetron, 4 mg, Q8H PRN   Or  ondansetron, 4 mg, Q6H PRN  polyethylene glycol, 17 g, Daily PRN  acetaminophen, 650 mg, Q6H PRN   Or  acetaminophen, 650 mg, Q6H PRN        Diagnostic Labs:  CBC:   Recent Labs     01/22/23  1057   WBC 4.6   RBC 2.96*   HGB 10.8*   HCT 31.1*   .1*   RDW 13.1   PLT See Reflexed IPF Result     BMP:   Recent Labs     01/22/23  1044 01/22/23  1057   NA  --  134*   K  --  3.9   CL  --  99   CO2  --  22   BUN  --  20   CREATININE 1.03 0.84     BNP: No results for input(s): BNP in the last 72 hours. PT/INR: No results for input(s): PROTIME, INR in the last 72 hours. APTT: No results for input(s): APTT in the last 72 hours. CARDIAC ENZYMES: No results for input(s): CKMB, CKMBINDEX, TROPONINI in the last 72 hours. Invalid input(s): CKTOTAL;3  FASTING LIPID PANEL:No results found for: CHOL, HDL, TRIG  LIVER PROFILE: No results for input(s): AST, ALT, ALB, BILIDIR, BILITOT, ALKPHOS in the last 72 hours. MICROBIOLOGY: No results found for: CULTURE    Imaging:    CT HEAD WO CONTRAST    Result Date: 1/22/2023  1. Moderate bifrontal and vertex atrophy versus less likely bilateral subdural hygromas in the bifrontal regions, right greater than left. Extra-axial space on the right measures 8 mm and measures 4 mm on the left. 2. No acute intracranial hemorrhage or midline shift. 3. Mild-to-moderate chronic small vessel ischemic white matter disease. 4. Mild ethmoid sinus disease. CT CERVICAL SPINE WO CONTRAST    Result Date: 1/22/2023  No acute abnormality of the cervical spine.      XR CHEST PORTABLE    Result Date: 1/22/2023  Cardiomegaly and mild pulmonary vascular congestion. Small left-sided pleural effusion and left basilar airspace disease, atelectasis and/or infiltrate. CT CHEST PULMONARY EMBOLISM W CONTRAST    Result Date: 1/22/2023  1. Mild scattered opacities in the lungs could represent sequela of COVID-atypical viral pneumonia/residual pneumonia. Moderate emphysema. Mild dependent atelectasis, respiratory motion and parenchymal banding. 2. No clear evidence for central pulmonary embolus within the limitations of this study. 3. Coronary artery disease. Cardiomegaly. Atherosclerotic calcification of the aorta. ASSESSMENT & PLAN     ASSESSMENT / PLAN:     Principal Problem:    Syncope and collapse  Active Problems:    COPD (chronic obstructive pulmonary disease) (HCC)    Polymyalgia rheumatica (HCC)    Orthostatic hypotension    Subdural hygroma    COVID-19    Mixed hyperlipidemia  Resolved Problems:    * No resolved hospital problems. *    Syncope: Likely cardiogenic. Orthostats positive. Continue IV fluids at 50 mill per hour. CT head showing moderate bifrontal and vertex atrophy versus bilateral subdural hygromas, neurology was consulted, suspect likely cardiogenic syncope, recommend EEG. Await echo and cardiology recommendations. COPD: Has a remote history of smoking. CT chest suggestive of moderate emphysema, scattered opacities likely sequelae of atypical viral pneumonia. Currently on 4 L via nasal cannula. Continue Decadron, Zithromax. Continue breathing treatments. Continue to wean down on oxygen. Polymyalgia rheumatica: Follows up with rheumatology, on prednisone 2.5 mg daily. Patient has been taking prednisone for close to 2 years now. Hypomagnesemia: Now resolved. Continue to replace as indicated.     DVT ppx : Lovenox  GI ppx: Not indicated    PT/OT: Consulted  Discharge Planning / SW: In progress    Carmen Donnelly MD  Internal Medicine Resident, PGY-1  [address], [place]  1/23/2023, 6:00 AM

## 2023-01-23 NOTE — CARE COORDINATION
Case Management Assessment  Initial Evaluation    Date/Time of Evaluation: 1/23/2023 5:06 PM  Assessment Completed by: Sheryle Kell, RN    If patient is discharged prior to next notation, then this note serves as note for discharge by case management. Patient Name: Mary Carmen Owen                   YOB: 1945  Diagnosis: Syncope and collapse [R55]                   Date / Time: 1/22/2023 10:36 AM    Patient Admission Status: Inpatient   Readmission Risk (Low < 19, Mod (19-27), High > 27): Readmission Risk Score: 12    Current PCP: Nhi Jarquin  PCP verified by CM? Yes    Chart Reviewed: Yes      History Provided by: Patient  Patient Orientation: Alert and Oriented    Patient Cognition: Alert    Hospitalization in the last 30 days (Readmission):  No    If yes, Readmission Assessment in CM Navigator will be completed. Advance Directives:      Code Status: Full Code   Patient's Primary Decision Maker is:      Primary Decision Maker: Faviola Canavan - Spouse - [phone]    Discharge Planning:    Patient lives with: (P) Spouse/Significant Other Type of Home: (P) House  Primary Care Giver: Self  Patient Support Systems include: Family Members   Current Financial resources: (P) Medicare  Current community resources:    Current services prior to admission: (P) None            Current DME:              Type of Home Care services:  (P) Nursing Services, OT, PT    ADLS  Prior functional level: (P) Independent in ADLs/IADLs  Current functional level: (P) Independent in ADLs/IADLs    PT AM-PAC: 22 /24  OT AM-PAC: 24 /24    Family can provide assistance at DC: Would you like Case Management to discuss the discharge plan with any other family members/significant others, and if so, who?     Plans to Return to Present Housing: (P) Yes  Other Identified Issues/Barriers to RETURNING to current housing: none  Potential Assistance needed at discharge: (P) Home Care            Potential DME:    Patient expects to discharge to: (P) [facility] for transportation at discharge:      Financial    Payor: [place] / Plan: MEDICARE PART A AND B / Product Type: *No Product type* /     Does insurance require precert for SNF: No    Potential assistance Purchasing Medications:    Meds-to-Beds request: Yes      [address] Alves, [address] [phone] Parisa Rodriguez [phone]  [address]  [address] [facility] [address]  Phone: [phone] Fax: [phone]      Notes:    Factors facilitating achievement of predicted outcomes: Family support, Motivated, Cooperative, and Pleasant    Barriers to discharge: Medical complications    Additional Case Management Notes: patient plans to return home with wife. He is requesting home care. [place] of choice provided. Referral to [facility]. Yas notified. They are able to accept. Patient denies other needs and has transportation. Patient's current address is [address] [place] [address] Dr. Patricia Aaron, [address]    The Plan for Transition of Care is related to the following treatment goals of Syncope and collapse [J82]    IF APPLICABLE: The Patient and/or patient representative Wendy Green and his family were provided with a choice of provider and agrees with the discharge plan. Freedom of choice list with basic dialogue that supports the patient's individualized plan of care/goals and shares the quality data associated with the providers was provided to: (P) Patient   Patient Representative Name:       The Patient and/or Patient Representative Agree with the Discharge Plan?  (P) Yes    Peter Garza RN  Case Management Department  Ph: [phone] Fax: [phone]

## 2023-01-24 LAB
ABSOLUTE EOS #: 0 K/UL (ref 0–0.4)
ABSOLUTE IMMATURE GRANULOCYTE: 0.28 K/UL (ref 0–0.3)
ABSOLUTE LYMPH #: 0.6 K/UL (ref 1–4.8)
ABSOLUTE MONO #: 0.84 K/UL (ref 0.1–0.8)
ANION GAP SERPL CALCULATED.3IONS-SCNC: 10 MMOL/L (ref 9–17)
BASOPHILS # BLD: 0 % (ref 0–2)
BASOPHILS ABSOLUTE: 0 K/UL (ref 0–0.2)
BUN BLDV-MCNC: 15 MG/DL (ref 8–23)
CALCIUM SERPL-MCNC: 8.5 MG/DL (ref 8.6–10.4)
CHLORIDE BLD-SCNC: 103 MMOL/L (ref 98–107)
CO2: 23 MMOL/L (ref 20–31)
CREAT SERPL-MCNC: 0.61 MG/DL (ref 0.7–1.2)
EKG ATRIAL RATE: 68 BPM
EKG P AXIS: 42 DEGREES
EKG P-R INTERVAL: 200 MS
EKG Q-T INTERVAL: 396 MS
EKG QRS DURATION: 158 MS
EKG QTC CALCULATION (BAZETT): 421 MS
EKG R AXIS: 22 DEGREES
EKG T AXIS: -10 DEGREES
EKG VENTRICULAR RATE: 68 BPM
EOSINOPHILS RELATIVE PERCENT: 0 % (ref 1–4)
GFR SERPL CREATININE-BSD FRML MDRD: >60 ML/MIN/1.73M2
GLUCOSE BLD-MCNC: 146 MG/DL (ref 70–99)
HCT VFR BLD CALC: 29.4 % (ref 40.7–50.3)
HEMOGLOBIN: 10 G/DL (ref 13–17)
IMMATURE GRANULOCYTES: 12 %
LV EF: 65 %
LVEF MODALITY: NORMAL
LYMPHOCYTES # BLD: 26 % (ref 24–44)
MCH RBC QN AUTO: 36.4 PG (ref 25.2–33.5)
MCHC RBC AUTO-ENTMCNC: 34 G/DL (ref 28.4–34.8)
MCV RBC AUTO: 106.9 FL (ref 82.6–102.9)
MONOCYTES # BLD: 37 % (ref 1–7)
MORPHOLOGY: ABNORMAL
MORPHOLOGY: ABNORMAL
NRBC AUTOMATED: 0 PER 100 WBC
PDW BLD-RTO: 13 % (ref 11.8–14.4)
PLATELET # BLD: 137 K/UL (ref 138–453)
PMV BLD AUTO: 12.1 FL (ref 8.1–13.5)
POTASSIUM SERPL-SCNC: 4.3 MMOL/L (ref 3.7–5.3)
RBC # BLD: 2.75 M/UL (ref 4.21–5.77)
SEG NEUTROPHILS: 25 % (ref 36–66)
SEGMENTED NEUTROPHILS ABSOLUTE COUNT: 0.58 K/UL (ref 1.8–7.7)
SODIUM BLD-SCNC: 136 MMOL/L (ref 135–144)
WBC # BLD: 2.3 K/UL (ref 3.5–11.3)

## 2023-01-24 PROCEDURE — 93010 ELECTROCARDIOGRAM REPORT: CPT | Performed by: INTERNAL MEDICINE

## 2023-01-24 PROCEDURE — 36415 COLL VENOUS BLD VENIPUNCTURE: CPT

## 2023-01-24 PROCEDURE — 2060000000 HC ICU INTERMEDIATE R&B

## 2023-01-24 PROCEDURE — 2580000003 HC RX 258

## 2023-01-24 PROCEDURE — 99232 SBSQ HOSP IP/OBS MODERATE 35: CPT | Performed by: INTERNAL MEDICINE

## 2023-01-24 PROCEDURE — 6370000000 HC RX 637 (ALT 250 FOR IP)

## 2023-01-24 PROCEDURE — 93306 TTE W/DOPPLER COMPLETE: CPT

## 2023-01-24 PROCEDURE — 80048 BASIC METABOLIC PNL TOTAL CA: CPT

## 2023-01-24 PROCEDURE — 6360000002 HC RX W HCPCS

## 2023-01-24 PROCEDURE — 85025 COMPLETE CBC W/AUTO DIFF WBC: CPT

## 2023-01-24 RX ORDER — TRAZODONE HYDROCHLORIDE 50 MG/1
50 TABLET ORAL ONCE
Status: COMPLETED | OUTPATIENT
Start: 2023-01-24 | End: 2023-01-24

## 2023-01-24 RX ADMIN — DEXAMETHASONE 4 MG: 4 TABLET ORAL at 08:38

## 2023-01-24 RX ADMIN — AZITHROMYCIN 250 MG: 250 TABLET, FILM COATED ORAL at 08:39

## 2023-01-24 RX ADMIN — ENOXAPARIN SODIUM 40 MG: 100 INJECTION SUBCUTANEOUS at 08:39

## 2023-01-24 RX ADMIN — TRAZODONE HYDROCHLORIDE 50 MG: 50 TABLET ORAL at 20:47

## 2023-01-24 RX ADMIN — SODIUM CHLORIDE, PRESERVATIVE FREE 10 ML: 5 INJECTION INTRAVENOUS at 20:47

## 2023-01-24 RX ADMIN — SODIUM CHLORIDE, PRESERVATIVE FREE 10 ML: 5 INJECTION INTRAVENOUS at 08:39

## 2023-01-24 ASSESSMENT — ENCOUNTER SYMPTOMS
EYE ITCHING: 0
VOICE CHANGE: 0
EYE DISCHARGE: 0
SHORTNESS OF BREATH: 0
WHEEZING: 0
SORE THROAT: 0
VOMITING: 0
COUGH: 0
NAUSEA: 0
ABDOMINAL PAIN: 0
BACK PAIN: 0
RHINORRHEA: 0

## 2023-01-24 NOTE — CONSULTS
[facility]   Consult Note         Today's Date: 1/24/2023  Patient Name: Shoshana Walters  Date of admission: 1/22/2023 10:36 AM  Patient's age: 76 y. o., 1945  Admission Dx: Syncope and collapse [R55]    Reason for Consult:  Cardiac evaluation    Requesting Physician: Anderson Owen MD    REASON FOR CONSULT:  Syncope    History Obtained From:  Patient, chart, staff, records    HISTORY OF PRESENT ILLNESS:      The patient is a 76 y.o. male who is admitted to the hospital for Syncope  Patient complains of syncope. Onset was 2 days ago. Symptoms have  improved since that time. Patient describes the episode as a sudden loss of consciousness without warning. Associated symptoms: general feeling of lightheadedness. The patient denies abdominal pain, diarrhea, headache, melena, nausea, and tachycardia/palpitations. Briefly patient is a 75-year-old male with no significant past medical history came to the hospital with chief complaint of syncope. Patient mentions that he was having upper respiratory tract symptoms, was diagnosed with COVID recently around 10 days ago and was recently admitted to [facility] with left lower lobe infiltrate patient was discharged on Keflex and Decadron. Patient had his intake has been low since the last few days. Mentioned that he woke up and went to the bar top and had an episode of syncope. He does not remember passing out, had urinary incontinence and then gradually regained consciousness. Initially in the ED patient was hypotensive with blood pressure of 100/60 and orthostatics were positive, patient was started on 2 L nasal cannula and admitted to internal medicine for further management. On talking to the patient, patient reports that he takes prednisone for polymyalgia rheumatica, does not have any other significant past medical history. Mentions that he saw a cardiologist around 10 years ago underwent a stress test which was negative. Patient was given IV fluids. Patient was evaluated by neurology feels that symptoms are suggestive of cardiogenic syncope, CT head done. Past Medical History:   has a past medical history of Hyperlipidemia. Past Surgical History:   has a past surgical history that includes Vasectomy. Home Medications:    Prior to Admission medications    Medication Sig Start Date End Date Taking? Authorizing Provider   predniSONE (DELTASONE) 5 MG tablet Take 5 mg by mouth daily   Yes Historical Provider, MD   simvastatin (ZOCOR) 20 MG tablet  4/6/18   Historical Provider, MD Samuel Herron ON 1/25/2023] predniSONE, 2.5 mg, Oral, Daily    sodium chloride flush, 5-40 mL, IntraVENous, 2 times per day    enoxaparin, 40 mg, SubCUTAneous, Daily      Allergies:  Doxycycline, Oxycodone-acetaminophen, Pcn [penicillins], and Pneumococcal vaccines    Social History:   reports that he quit smoking about 15 years ago. His smoking use included cigarettes. He has a 12.50 pack-year smoking history. He has never used smokeless tobacco. He reports current alcohol use of about 2.0 standard drinks per week. He reports that he does not use drugs. Family History: family history includes Cancer (age of onset: 62) in his mother; Diabetes in his maternal uncle. No h/o sudden cardiac death. REVIEW OF SYSTEMS:    Constitutional: there has been no unanticipated weight loss. There's been No change in energy level, No change in activity level. Eyes: No visual changes or diplopia. No scleral icterus. ENT: No Headaches, hearing loss or vertigo. No mouth sores or sore throat. Cardiovascular: per HPI  Respiratory: per HPI  Gastrointestinal: No abdominal pain, appetite loss, blood in stools. No change in bowel or bladder habits. Genitourinary: No dysuria, trouble voiding, or hematuria. Musculoskeletal:  No gait disturbance, No weakness or joint complaints. Integumentary: No rash or pruritis.   Neurological: No headache, diplopia, change in muscle strength, numbness or tingling. No change in gait, balance, coordination, mood, affect, memory, mentation, behavior. Psychiatric: No anxiety, or depression. Endocrine: No temperature intolerance. No excessive thirst, fluid intake, or urination. No tremor. Hematologic/Lymphatic: No abnormal bruising or bleeding, blood clots or swollen lymph nodes. Allergic/Immunologic: No nasal congestion or hives. PHYSICAL EXAM:      /73   Pulse 70   Temp 97.5 °F (36.4 °C) (Oral)   Resp 18   Ht 6' (1.829 m)   Wt 204 lb 5.9 oz (92.7 kg)   SpO2 98%   BMI 27.72 kg/m²    Constitutional and General Appearance: alert, cooperative, no distress and appears stated age  HEENT: PERRL, no cervical lymphadenopathy. No masses palpable. Normal oral mucosa  Respiratory:  Normal excursion and expansion without use of accessory muscles  Resp Auscultation: Good respiratory effort. No for increased work of breathing. On auscultation: clear to auscultation bilaterally  Cardiovascular: The apical impulse is not displaced  Heart tones are crisp and normal. regular S1 and S2.  Jugular venous pulsation Normal  The carotid upstroke is normal in amplitude and contour without delay or bruit  Peripheral pulses are symmetrical and full   Abdomen:   No masses or tenderness  Bowel sounds present  Extremities:   No Cyanosis or Clubbing   Lower extremity edema: No   Skin: Warm and dry  Neurological:  Alert and oriented. Moves all extremities well  No abnormalities of mood, affect, memory, mentation, or behavior are noted      Labs:     CBC:   Recent Labs     01/23/23  0657 01/24/23  0644   WBC 2.5* 2.3*   HGB 9.8* 10.0*   HCT 30.1* 29.4*   * 137*     BMP:   Recent Labs     01/23/23  0657 01/24/23  0644   * 136   K 4.7 4.3   CO2 21 23   BUN 17 15   CREATININE 0.65* 0.61*   LABGLOM >60 >60   GLUCOSE 139* 146*     BNP: No results for input(s): BNP in the last 72 hours.   PT/INR: No results for input(s): PROTIME, INR in the last 72 hours. APTT:No results for input(s): APTT in the last 72 hours. CARDIAC ENZYMES:No results for input(s): CKTOTAL, CKMB, CKMBINDEX, TROPONINI in the last 72 hours. FASTING LIPID PANEL:No results found for: HDL, LDLDIRECT, LDLCALC, TRIG  LIVER PROFILE:No results for input(s): AST, ALT, LABALBU in the last 72 hours. Troponins: Invalid input(s): TROPONIN     Other Current Problems  Patient Active Problem List   Diagnosis    Epiglottiditis    Mixed hyperlipidemia    Hearing decreased    Primary insomnia    Thrombocytopenia, acquired ([facility] [address]    Lymphocytopenia    Syncope and collapse    COPD (chronic obstructive pulmonary disease) (HCC)    Polymyalgia rheumatica (HCC)    Orthostatic hypotension    Subdural hygroma    COVID-19       EKG:    Date: 01/24/23  Reading: No acute ischemia      LAST ECHO:  Date:  Findings Summary:      LAST Stress Test:   Date of last ST:  Major Findings:    LAST Cardiac Angiography:.  Date:  Findings:      IMPRESSION&Recommendations:    Covid-19. COPD. Syncope. Orthostatics positive on Admission. Mixed Hyperlipidemia  Polymyalgia Rheumatica      Syncope  -Orthostatics positive on Admission. IV fluids as per Primary.  -Check 2D echocardiogram to rule out cardiac structural and functional disease.  -Continue telemetry monitoring to evaluate for arrhythmias.  -Recommended to avoid dehydration, paying close attention to any prodromal symptoms.  -Patient also to have compression stocking and encouraged to use them when working long hours and need to stand for a long time. Please wait for Attendings Attestation. Thank you for allowing us to participate in the care of this patient. Clifford Rodriguez MD  PGY-1, Internal Medicine Resident  [facility]         1/24/2023, 8:49 AM         Attending Physician Statement  I have discussed the care of Rico Bustamante, including pertinent history and exam findings,  with the cardiology fellow/resident.  I have seen and examined the patient and the key elements of all parts of the encounter have been performed by me. I have completed at least one if not all key elements of the E/M (history, physical exam, and MDM). I agree with the assessment, plan and orders as documented by the resident with additional recommendations as below:    Syncope secondary to hypotension. COVID-19 infection. Ecg shows normal sinus rhythm, no bradyarrhythmias on telemetry. Troponin's normal. Continue IVF. Will obtain echo to r/o any structural heart disease. Holter x 48 hours on discharge. Will consider low dose midodrine if orthostatic vitals remain positive after IV hydration.      Jorge Love MD, [facility]

## 2023-01-24 NOTE — PROGRESS NOTES
[facility]  Internal Medicine Teaching Residency Program  Inpatient Daily Progress Note  ______________________________________________________________________________    Patient: Berton Goodell  YOB: 1945   QFX:[id number]    Acct: [de-identified]     Room: 2027/2027-01  Admit date: 1/22/2023  Today's date: 01/24/23  Number of days in the hospital: 2    SUBJECTIVE   Admitting Diagnosis: Syncope and collapse  CC: Syncope  Pt examined at bedside. Chart & results reviewed. No acute overnight events. VS stable. Orthostats positive on admission. Repeat orthostats negative. Denies any complaints. Cardiology recommending to obtain echo, Holter on discharge    Review of Systems   Constitutional:  Negative for activity change, chills and fever. HENT:  Negative for congestion, rhinorrhea, sore throat and voice change. Eyes:  Negative for discharge and itching. Respiratory:  Negative for cough, shortness of breath and wheezing. Cardiovascular:  Negative for chest pain and palpitations. Gastrointestinal:  Negative for abdominal pain, nausea and vomiting. Endocrine: Negative for cold intolerance. Genitourinary:  Negative for difficulty urinating, frequency and urgency. Musculoskeletal:  Negative for arthralgias, back pain and myalgias. Skin:  Negative for pallor and wound. Neurological:  Negative for dizziness and light-headedness. Psychiatric/Behavioral:  Negative for agitation. BRIEF HISTORY     The patient is a pleasant 76 y.o. male presents with a chief complaint of syncope. He has a past medical history of Polymyalgia rheumatica on prednisone, GERD, pancytopenia, epiglottitis, hearing loss, hyperlipidemia. According to wife patient was standing near the bar top when he had an episode of syncope. Patient does not remember passing out, he did hit his head and was incontinent of urine.   The patient then gradually regained consciousness. Wife called the EMS who brought the patient to the hospital.  According to the EMS the patient had 1 episode of asystole but the patient woke up and became alert on his own. Denies any chest pain, lightheadedness, dizziness, nausea, vomiting. Patient was recently admitted to [facility] for COVID-19 infection with left lower lobe infiltrate. CT scan showed concerns of epiglottitis. Patient was discharged on Keflex and Decadron. He has history of polymyalgia rheumatica and has been on prednisone for 2 years. On arrival patient was hypoxic, BP 96/56, was started on 2 L via nasal cannula       OBJECTIVE     Vital Signs:  /67   Pulse 75   Temp 97.6 °F (36.4 °C) (Oral)   Resp 18   Ht 6' (1.829 m)   Wt 204 lb 5.9 oz (92.7 kg)   SpO2 95%   BMI 27.72 kg/m²     Temp (24hrs), Av.7 °F (36.5 °C), Min:97.4 °F (36.3 °C), Max:98.2 °F (36.8 °C)    In: -   Out: 2375 [Urine:2375]    Physical Exam  Vitals and nursing note reviewed. Constitutional:       General: He is not in acute distress. Appearance: Normal appearance. He is not ill-appearing. Eyes:      Conjunctiva/sclera: Conjunctivae normal.      Pupils: Pupils are equal, round, and reactive to light. Cardiovascular:      Rate and Rhythm: Normal rate and regular rhythm. Pulses: Normal pulses. Heart sounds: No murmur heard. Pulmonary:      Effort: Pulmonary effort is normal. No respiratory distress. Breath sounds: Normal breath sounds. No wheezing, rhonchi or rales. Abdominal:      General: Bowel sounds are normal. There is no distension. Palpations: Abdomen is soft. Tenderness: There is no abdominal tenderness. Musculoskeletal:         General: Signs of injury (Right eyebrow) present. Normal range of motion. Cervical back: Normal range of motion and neck supple. Skin:     Capillary Refill: Capillary refill takes less than 2 seconds.    Neurological:      General: No focal deficit present. Mental Status: He is alert. Mental status is at baseline. Psychiatric:         Mood and Affect: Mood normal.         Behavior: Behavior normal.         Medications:  Scheduled Medications:    [START ON 1/25/2023] predniSONE  2.5 mg Oral Daily    sodium chloride flush  5-40 mL IntraVENous 2 times per day    enoxaparin  40 mg SubCUTAneous Daily     Continuous Infusions:    sodium chloride       PRN Medicationssodium chloride flush, 5-40 mL, PRN  sodium chloride, , PRN  ondansetron, 4 mg, Q8H PRN   Or  ondansetron, 4 mg, Q6H PRN  polyethylene glycol, 17 g, Daily PRN  acetaminophen, 650 mg, Q6H PRN   Or  acetaminophen, 650 mg, Q6H PRN        Diagnostic Labs:  CBC:   Recent Labs     01/22/23  1057 01/23/23  0657 01/24/23  0644   WBC 4.6 2.5* 2.3*   RBC 2.96* 2.69* 2.75*   HGB 10.8* 9.8* 10.0*   HCT 31.1* 30.1* 29.4*   .1* 111.9* 106.9*   RDW 13.1 13.2 13.0   PLT See Reflexed IPF Result 112* 137*     BMP:   Recent Labs     01/22/23  1057 01/23/23  0657 01/24/23  0644   * 132* 136   K 3.9 4.7 4.3   CL 99 103 103   CO2 22 21 23   BUN 20 17 15   CREATININE 0.84 0.65* 0.61*     BNP: No results for input(s): BNP in the last 72 hours. PT/INR: No results for input(s): PROTIME, INR in the last 72 hours. APTT: No results for input(s): APTT in the last 72 hours. CARDIAC ENZYMES: No results for input(s): CKMB, CKMBINDEX, TROPONINI in the last 72 hours. Invalid input(s): CKTOTAL;3  FASTING LIPID PANEL:No results found for: CHOL, HDL, TRIG  LIVER PROFILE: No results for input(s): AST, ALT, ALB, BILIDIR, BILITOT, ALKPHOS in the last 72 hours. MICROBIOLOGY: No results found for: CULTURE    Imaging:    CT HEAD WO CONTRAST    Result Date: 1/22/2023  1. Moderate bifrontal and vertex atrophy versus less likely bilateral subdural hygromas in the bifrontal regions, right greater than left. Extra-axial space on the right measures 8 mm and measures 4 mm on the left.  2. No acute intracranial hemorrhage or midline shift. 3. Mild-to-moderate chronic small vessel ischemic white matter disease. 4. Mild ethmoid sinus disease. CT CERVICAL SPINE WO CONTRAST    Result Date: 1/22/2023  No acute abnormality of the cervical spine. XR CHEST PORTABLE    Result Date: 1/22/2023  Cardiomegaly and mild pulmonary vascular congestion. Small left-sided pleural effusion and left basilar airspace disease, atelectasis and/or infiltrate. CT CHEST PULMONARY EMBOLISM W CONTRAST    Result Date: 1/22/2023  1. Mild scattered opacities in the lungs could represent sequela of COVID-atypical viral pneumonia/residual pneumonia. Moderate emphysema. Mild dependent atelectasis, respiratory motion and parenchymal banding. 2. No clear evidence for central pulmonary embolus within the limitations of this study. 3. Coronary artery disease. Cardiomegaly. Atherosclerotic calcification of the aorta. ASSESSMENT & PLAN     ASSESSMENT / PLAN:     Principal Problem:    Syncope and collapse  Active Problems:    COPD (chronic obstructive pulmonary disease) (HCC)    Polymyalgia rheumatica (HCC)    Orthostatic hypotension    Subdural hygroma    COVID-19    Mixed hyperlipidemia  Resolved Problems:    * No resolved hospital problems. *    Syncope: Likely cardiogenic. Orthostats positive on admission. Repeat orthostatics negative. Cardiology consulted, recommending 2D echo and Holter on discharge. COPD: Has remote history of smoking. CT suggestive of emphysema. Saturating well on room air. Continue Decadron, Zithromax for today. Continue prednisone 2.5 mg from tomorrow. Polymyalgia rheumatica: Follows up with rheumatology, on prednisone 2.5 mg daily. DVT ppx : Lovenox  GI ppx: Not indicated    PT/OT: Consulted  Discharge Planning / SW: In progress    Maria Del Carmen Ford MD  Internal Medicine Resident, PGY-1  [address], [place]  1/24/2023, 1:43 PM

## 2023-01-24 NOTE — PLAN OF CARE
Problem: Discharge Planning  Goal: Discharge to home or other facility with appropriate resources  1/24/2023 0248 by Jamari Ingram RN  Outcome: Progressing  1/23/2023 1811 by Codey Mcmullen RN  Outcome: Progressing     Problem: Pain  Goal: Verbalizes/displays adequate comfort level or baseline comfort level  1/24/2023 0248 by Jamari Ingram RN  Outcome: Progressing  1/23/2023 1811 by Codey Mcmullen RN  Outcome: Progressing     Problem: Nutrition Deficit:  Goal: Optimize nutritional status  1/24/2023 0248 by Jamari Ingram RN  Outcome: Progressing  1/23/2023 1811 by Codey Mcmullen RN  Outcome: Progressing

## 2023-01-25 VITALS
HEIGHT: 72 IN | RESPIRATION RATE: 16 BRPM | WEIGHT: 204.37 LBS | HEART RATE: 68 BPM | SYSTOLIC BLOOD PRESSURE: 114 MMHG | DIASTOLIC BLOOD PRESSURE: 71 MMHG | BODY MASS INDEX: 27.68 KG/M2 | TEMPERATURE: 97.7 F | OXYGEN SATURATION: 94 %

## 2023-01-25 LAB
ABSOLUTE EOS #: 0 K/UL (ref 0–0.4)
ABSOLUTE IMMATURE GRANULOCYTE: 0.62 K/UL (ref 0–0.3)
ABSOLUTE LYMPH #: 1.36 K/UL (ref 1–4.8)
ABSOLUTE MONO #: 1.14 K/UL (ref 0.1–0.8)
ANION GAP SERPL CALCULATED.3IONS-SCNC: 9 MMOL/L (ref 9–17)
BASOPHILS # BLD: 0 % (ref 0–2)
BASOPHILS ABSOLUTE: 0 K/UL (ref 0–0.2)
BUN BLDV-MCNC: 17 MG/DL (ref 8–23)
CALCIUM SERPL-MCNC: 8.5 MG/DL (ref 8.6–10.4)
CHLORIDE BLD-SCNC: 102 MMOL/L (ref 98–107)
CO2: 24 MMOL/L (ref 20–31)
CREAT SERPL-MCNC: 0.87 MG/DL (ref 0.7–1.2)
EOSINOPHILS RELATIVE PERCENT: 0 % (ref 1–4)
GFR SERPL CREATININE-BSD FRML MDRD: >60 ML/MIN/1.73M2
GLUCOSE BLD-MCNC: 95 MG/DL (ref 70–99)
HCT VFR BLD CALC: 30.8 % (ref 40.7–50.3)
HEMOGLOBIN: 10.3 G/DL (ref 13–17)
IMMATURE GRANULOCYTES: 14 %
LYMPHOCYTES # BLD: 31 % (ref 24–44)
MCH RBC QN AUTO: 36 PG (ref 25.2–33.5)
MCHC RBC AUTO-ENTMCNC: 33.4 G/DL (ref 28.4–34.8)
MCV RBC AUTO: 107.7 FL (ref 82.6–102.9)
MONOCYTES # BLD: 26 % (ref 1–7)
MORPHOLOGY: ABNORMAL
NRBC AUTOMATED: 0 PER 100 WBC
NUCLEATED RED BLOOD CELLS: 1 PER 100 WBC
PDW BLD-RTO: 13.1 % (ref 11.8–14.4)
PLATELET # BLD: 165 K/UL (ref 138–453)
PMV BLD AUTO: 12.3 FL (ref 8.1–13.5)
POTASSIUM SERPL-SCNC: 3.8 MMOL/L (ref 3.7–5.3)
RBC # BLD: 2.86 M/UL (ref 4.21–5.77)
SEG NEUTROPHILS: 29 % (ref 36–66)
SEGMENTED NEUTROPHILS ABSOLUTE COUNT: 1.28 K/UL (ref 1.8–7.7)
SODIUM BLD-SCNC: 135 MMOL/L (ref 135–144)
WBC # BLD: 4.4 K/UL (ref 3.5–11.3)

## 2023-01-25 PROCEDURE — 36415 COLL VENOUS BLD VENIPUNCTURE: CPT

## 2023-01-25 PROCEDURE — 6370000000 HC RX 637 (ALT 250 FOR IP): Performed by: STUDENT IN AN ORGANIZED HEALTH CARE EDUCATION/TRAINING PROGRAM

## 2023-01-25 PROCEDURE — 80048 BASIC METABOLIC PNL TOTAL CA: CPT

## 2023-01-25 PROCEDURE — 85025 COMPLETE CBC W/AUTO DIFF WBC: CPT

## 2023-01-25 RX ORDER — BENZONATATE 100 MG/1
100 CAPSULE ORAL 3 TIMES DAILY PRN
Qty: 21 CAPSULE | Refills: 0 | Status: SHIPPED | OUTPATIENT
Start: 2023-01-25 | End: 2023-02-01

## 2023-01-25 RX ORDER — PREDNISONE 2.5 MG
2.5 TABLET ORAL DAILY
Qty: 30 TABLET | Refills: 0 | Status: SHIPPED | OUTPATIENT
Start: 2023-01-25

## 2023-01-25 RX ADMIN — PREDNISONE 2.5 MG: 5 TABLET ORAL at 09:24

## 2023-01-25 NOTE — CARE COORDINATION
Met with patient to discuss transitional planning. He is returning home with his wife and [place]. He denies needs and has transportation. Notified Yas from [facility] of discharge today.  Patient's phone number confirmed    Discharge Report    [facility] Case Management Department  Written by: Julio Salazar RN    Patient Name: Satish Norman  Attending Provider: Reji Valdez MD  Admit Date: 2023 10:36 AM  MRN: [id number]  Account: [de-identified]                     : 1945  Discharge Date: 2023      Disposition: home    Julio Salazar RN

## 2023-01-25 NOTE — DISCHARGE INSTRUCTIONS
You were admitted to hospital for loss of consciousness which was related to drop in blood pressure on standing. You received IV fluids during hospitalization. Echocardiogram was normal.  You were evaluated by cardiology and recommended Holter monitor on discharge for 48 hours. You are being discharged home with the following instructions:  -You are advised to drink plenty of electrolyte water like Gatorade  -Please wear compression stockings during the daytime  -Please watch for any dizziness.   Be careful when going from sitting to standing position.  -Please follow-up with your PCP  -Please continue your home dose of prednisone 2.5 mg daily for PMR history

## 2023-01-25 NOTE — PLAN OF CARE
Problem: Discharge Planning  Goal: Discharge to home or other facility with appropriate resources  1/25/2023 0908 by Nhung Dallas RN  Outcome: Adequate for Discharge  1/25/2023 0311 by Antony Sorensen RN  Outcome: Progressing     Problem: Pain  Goal: Verbalizes/displays adequate comfort level or baseline comfort level  1/25/2023 0908 by Nhung Dallas RN  Outcome: Adequate for Discharge  1/25/2023 0311 by Antony Sorensen RN  Outcome: Progressing     Problem: Nutrition Deficit:  Goal: Optimize nutritional status  1/25/2023 0908 by Nhung Dallas RN  Outcome: Adequate for Discharge  1/25/2023 0311 by Antony Sorensen RN  Outcome: Progressing

## 2023-01-25 NOTE — PROGRESS NOTES
[facility]  Internal Medicine Teaching Residency Program  Inpatient Daily Progress Note  ______________________________________________________________________________    Patient: Chelsie Martinez  YOB: 1945   VDB:[id number]    Acct: [de-identified]     Room: 2027/2027-01  Admit date: 1/22/2023  Today's date: 01/25/23  Number of days in the hospital: 3    SUBJECTIVE   Admitting Diagnosis: Syncope and collapse  CC: Syncope  Pt examined at bedside. Chart & results reviewed. No acute events overnight. Resting comfortably in the bed this morning. On room air. Off IV fluids. No dizziness. Ambulating fine. Telemetry with normal sinus rhythm. Echocardiogram reviewed and is normal.  Complaining of some cough this morning which is dry, likely related to recent COVID. ROS:  Constitutional:  negative for chills, fevers, sweats  Respiratory: Dry cough. No shortness of breath. Cardiovascular:  negative for chest pain, chest pressure/discomfort, lower extremity edema, palpitations  Gastrointestinal:  negative for abdominal pain, constipation, diarrhea, nausea, vomiting  Neurological:  negative for dizziness, headache  BRIEF HISTORY     The patient is a pleasant 76 y.o. male presents with a chief complaint of syncope. He has a past medical history of Polymyalgia rheumatica on prednisone, GERD, pancytopenia, epiglottitis, hearing loss, hyperlipidemia. According to wife patient was standing near the bar top when he had an episode of syncope. Patient does not remember passing out, he did hit his head and was incontinent of urine. The patient then gradually regained consciousness. Wife called the EMS who brought the patient to the hospital.  According to the EMS the patient had 1 episode of asystole but the patient woke up and became alert on his own. Denies any chest pain, lightheadedness, dizziness, nausea, vomiting.      Patient was recently admitted to [facility] for COVID-19 infection with left lower lobe infiltrate. CT scan showed concerns of epiglottitis. Patient was discharged on Keflex and Decadron. He has history of polymyalgia rheumatica and has been on prednisone for 2 years. OBJECTIVE     Vital Signs:  /71   Pulse 68   Temp 97.7 °F (36.5 °C) (Oral)   Resp 16   Ht 6' (1.829 m)   Wt 204 lb 5.9 oz (92.7 kg)   SpO2 94%   BMI 27.72 kg/m²     Temp (24hrs), Av.6 °F (36.4 °C), Min:97.1 °F (36.2 °C), Max:97.9 °F (36.6 °C)    In: 480   Out: 650 [Urine:650]    Physical Exam:  Constitutional: This is a well developed, well nourished, 76y.o. year old male who is alert, oriented, cooperative and in no apparent distress. Head:normocephalic and atraumatic. EENT:  PERRLA. No conjunctival injections. Septum was midline, mucosa was without erythema, exudates or cobblestoning. No thrush was noted. Neck: Supple without thyromegaly. No elevated JVP. Trachea was midline. Respiratory: Chest was symmetrical without dullness to percussion. Breath sounds bilaterally were clear to auscultation. There were no wheezes, rhonchi or rales. There is no intercostal retraction or use of accessory muscles. No egophony noted. Cardiovascular: Regular without murmur, clicks, gallops or rubs. Abdomen: Slightly rounded and soft without organomegaly. No rebound, rigidity or guarding was appreciated. Lymphatic: No lymphadenopathy. Musculoskeletal: Normal curvature of the spine. No gross muscle weakness. Extremities:  No lower extremity edema, ulcerations, tenderness, varicosities or erythema. Muscle size, tone and strength are normal.  No involuntary movements are noted. Skin:  Warm and dry. Good color, turgor and pigmentation. No lesions or scars.   No cyanosis or clubbing  Neurological/Psychiatric: The patient's general behavior, level of consciousness, thought content and emotional status is normal.        Medications:  Scheduled Medications:    predniSONE  2.5 mg Oral Daily    sodium chloride flush  5-40 mL IntraVENous 2 times per day    enoxaparin  40 mg SubCUTAneous Daily     Continuous Infusions:    sodium chloride       PRN Medicationssodium chloride flush, 5-40 mL, PRN  sodium chloride, , PRN  ondansetron, 4 mg, Q8H PRN   Or  ondansetron, 4 mg, Q6H PRN  polyethylene glycol, 17 g, Daily PRN  acetaminophen, 650 mg, Q6H PRN   Or  acetaminophen, 650 mg, Q6H PRN        Diagnostic Labs:  CBC:   Recent Labs     01/23/23  0657 01/24/23  0644 01/25/23  0557   WBC 2.5* 2.3* 4.4   RBC 2.69* 2.75* 2.86*   HGB 9.8* 10.0* 10.3*   HCT 30.1* 29.4* 30.8*   .9* 106.9* 107.7*   RDW 13.2 13.0 13.1   * 137* 165     BMP:   Recent Labs     01/23/23  0657 01/24/23  0644 01/25/23  0557   * 136 135   K 4.7 4.3 3.8    103 102   CO2 21 23 24   BUN 17 15 17   CREATININE 0.65* 0.61* 0.87     BNP: No results for input(s): BNP in the last 72 hours. PT/INR: No results for input(s): PROTIME, INR in the last 72 hours. APTT: No results for input(s): APTT in the last 72 hours. CARDIAC ENZYMES: No results for input(s): CKMB, CKMBINDEX, TROPONINI in the last 72 hours. Invalid input(s): CKTOTAL;3  FASTING LIPID PANEL:No results found for: CHOL, HDL, TRIG  LIVER PROFILE: No results for input(s): AST, ALT, ALB, BILIDIR, BILITOT, ALKPHOS in the last 72 hours. MICROBIOLOGY: No results found for: CULTURE    Imaging:    CT HEAD WO CONTRAST    Result Date: 1/22/2023  1. Moderate bifrontal and vertex atrophy versus less likely bilateral subdural hygromas in the bifrontal regions, right greater than left. Extra-axial space on the right measures 8 mm and measures 4 mm on the left. 2. No acute intracranial hemorrhage or midline shift. 3. Mild-to-moderate chronic small vessel ischemic white matter disease. 4. Mild ethmoid sinus disease. CT CERVICAL SPINE WO CONTRAST    Result Date: 1/22/2023  No acute abnormality of the cervical spine.      XR CHEST PORTABLE    Result Date: 1/22/2023  Cardiomegaly and mild pulmonary vascular congestion. Small left-sided pleural effusion and left basilar airspace disease, atelectasis and/or infiltrate. CT CHEST PULMONARY EMBOLISM W CONTRAST    Result Date: 1/22/2023  1. Mild scattered opacities in the lungs could represent sequela of COVID-atypical viral pneumonia/residual pneumonia. Moderate emphysema. Mild dependent atelectasis, respiratory motion and parenchymal banding. 2. No clear evidence for central pulmonary embolus within the limitations of this study. 3. Coronary artery disease. Cardiomegaly. Atherosclerotic calcification of the aorta. ASSESSMENT & PLAN     ASSESSMENT / PLAN:     Principal Problem:      Syncope and collapse secondary to orthostatic hypotension  Plan: Echocardiogram ruled out any structural heart disease. Improved with IV fluids. Currently off IV fluids. Compression stockings on. Advised electrolyte water like Gatorade. Educated about orthostasis to prevent falls. Recheck orthostatic vitals today with compression stockings on. Discharge home today with Holter monitor for 48 hours. Active Problems:      COPD (chronic obstructive pulmonary disease) (HCC)  Plan: CT suggestive of emphysema. Not on any inhalers. Saturating well on room air. Completed Decadron for recent COVID infection. Polymyalgia rheumatica (HCC)  Plan: Stable. Restart home dose of prednisone, 2.5 mg daily. DVT ppx : Lovenox  Discharge planning: Discharge home today if orthostatics are negative     Mylene Mane MD  Internal Medicine Resident, PGY-3  [facility];  [place], [place]  1/25/2023, 8:54 AM

## 2023-01-25 NOTE — DISCHARGE INSTR - COC
Continuity of Care Form    Patient Name: Berton Goodell   :  1945  MRN:  [id number]    Admit date:  2023  Discharge date:  2023    Code Status Order: Full Code   Advance Directives:     Admitting Physician:  Ant Burnett MD  PCP: Narendra Miramontes    Discharging Nurse: [facility] Unit/Room#:   Discharging Unit Phone Number: [phone]    Emergency Contact:   Extended Emergency Contact Information  Primary Emergency Contact: [address] [facility] Phone: [phone]  Mobile Phone: [phone]  Relation: Spouse  Preferred language: English   needed? No  Secondary Emergency Contact: [address] Phone: [phone]  Mobile Phone: [phone]  Relation: Child    Past Surgical History:  Past Surgical History:   Procedure Laterality Date    VASECTOMY         Immunization History: There is no immunization history on file for this patient. Active Problems:  Patient Active Problem List   Diagnosis Code    Epiglottiditis J05.10    Mixed hyperlipidemia E78.2    Hearing decreased H91.90    Primary insomnia F51.01    Thrombocytopenia, acquired ([facility]) D69.6    Lymphocytopenia D72.810    Syncope and collapse R55    COPD (chronic obstructive pulmonary disease) ([facility]) J44.9    Polymyalgia rheumatica ([facility]) M35.3    Orthostatic hypotension I95.1    Subdural hygroma G96.08    COVID-19 U07.1       Isolation/Infection:   Isolation            Droplet Plus          Patient Infection Status       Infection Onset Added Last Indicated Last Indicated By Review Planned Expiration Resolved Resolved By    COVID-19 23 Olena Juares RN 23      Added from external infection.             Nurse Assessment:  Last Vital Signs: /71   Pulse 68   Temp 97.7 °F (36.5 °C) (Oral)   Resp 16   Ht 6' (1.829 m)   Wt 204 lb 5.9 oz (92.7 kg)   SpO2 94%   BMI 27.72 kg/m²     Last documented pain score (0-10 scale): Pain Level: 0  Last Weight:   Wt Readings from Last 1 Encounters:   01/22/23 204 lb 5.9 oz (92.7 kg)     Mental Status:  oriented, alert, coherent, logical, thought processes intact, and able to concentrate and follow conversation    IV Access:  - None    Nursing Mobility/ADLs:  Walking   Independent  Transfer  Independent  Bathing  Independent  Dressing  Independent  [address] Delivery   none    Wound Care Documentation and Therapy:        Elimination:  Continence: Bowel: Yes  Bladder: Yes  Urinary Catheter: None   Colostomy/Ileostomy/Ileal Conduit: No       Date of Last BM: ***    Intake/Output Summary (Last 24 hours) at 1/25/2023 0948  Last data filed at 1/24/2023 1800  Gross per 24 hour   Intake 480 ml   Output 400 ml   Net 80 ml     I/O last 3 completed shifts: In: 480 [P.O.:480]  Out: 1350 [Urine:1350]    Safety Concerns:     None    Impairments/Disabilities:      None    Nutrition Therapy:  Current Nutrition Therapy:   - Oral Diet:  General    Routes of Feeding: Oral  Liquids: No Restrictions  Daily Fluid Restriction: no  Last Modified Barium Swallow with Video (Video Swallowing Test): not done    Treatments at the Time of Hospital Discharge:   Respiratory Treatments: ***  Oxygen Therapy:  is not on home oxygen therapy.   Ventilator:    - No ventilator support    Rehab Therapies: Physical Therapy and Occupational Therapy  Weight Bearing Status/Restrictions: No weight bearing restrictions  Other Medical Equipment (for information only, NOT a DME order):  ***  Other Treatments: skilled nursing    Patient's personal belongings (please select all that are sent with patient):  Glasses, Hearing Aides bilateral, Dentures upper and lower    RN SIGNATURE:  Electronically signed by Jonathan Loyd RN on 1/25/23 at 9:54 AM EST    CASE MANAGEMENT/SOCIAL WORK SECTION    Inpatient Status Date: ***    Readmission Risk Assessment Score:  Readmission Risk Risk of Unplanned Readmission:  12           Discharging to Facility/ Agency   Name: [facility]  Phone: [phone]    / signature: Electronically signed by Marvel Brock RN on 1/25/23 at 10:32 AM EST    PHYSICIAN SECTION    Prognosis: Good    Condition at Discharge: Stable    Rehab Potential (if transferring to Rehab): Good    Recommended Labs or Other Treatments After Discharge:   -PT/OT    Physician Certification: I certify the above information and transfer of Param Zamorano  is necessary for the continuing treatment of the diagnosis listed and that he requires Home Care for greater 30 days.      Update Admission H&P: No change in H&P    PHYSICIAN SIGNATURE:  Electronically signed by Brois Whitt MD on 1/25/23 at 10:38 AM EST

## 2023-01-25 NOTE — PROGRESS NOTES
CLINICAL PHARMACY NOTE: MEDS TO BEDS    Total # of Prescriptions Filled: 1   The following medications were delivered to the patient:  BENZONATATE 100     Additional Documentation:

## 2023-01-25 NOTE — PROGRESS NOTES
Patient belongings gathered and with patient. Brother at bedside. Rx given with change. Patient taken down by wheelchair in no acute distress.

## 2023-01-26 NOTE — DISCHARGE SUMMARY
[address]     Department of Internal Medicine - Staff Internal Medicine Teaching Service    INPATIENT DISCHARGE SUMMARY      Patient Identification:  Jarrod Perez is a 76 y.o. male. :  1945  MRN: [id number]     Acct: [de-identified]   PCP: Nhi Monique  Admit Date:  2023  Discharge date and time: 2023 10:57 AM   Attending Provider: No att. providers found                                     [address] Problem Lists:  Principal Problem:    Syncope and collapse  Active Problems:    COPD (chronic obstructive pulmonary disease) (HCC)    Polymyalgia rheumatica (HCC)    Orthostatic hypotension    Subdural hygroma    COVID-19    Mixed hyperlipidemia  Resolved Problems:    * No resolved hospital problems. [facility] STAY     Brief Inpatient course:   Jarrod Perez is a 76 y.o. male who was admitted for the management of Syncope and collapse, presented to the emergency department with chief complaint of syncope. Is a past medical history of polymyalgia rheumatica on prednisone, GERD, pancytopenia, epiglottitis, hearing loss hyperlipidemia. According to the wife patient was standing near the bottle. He had an episode of syncope. Patient does not remember passing out, he did not hit his head but was not incontinent of urine. The patient then regained consciousness. Wife called EMS who brought the patient to the hospital, patient had another episode of unconsciousness but he woke up and became alert on his own. Denies any chest pain, lightheadedness, dizziness, nausea vomiting, cough, shortness of breath. In the hospital, his orthostats were positive, he was started on IV fluids and subsequently the patient improved. Cardiology recommended 2D echo showing EF of 65% with normal diastolic filling and Holter on discharge.     Patient was subsequently discharged home after improved with the IV fluids and recommended to use compression stockings. Procedures/ Significant Interventions:    ECHO: Global left ventricular systolic function is normal. Estimated EF 65%. No segmental wall motion abnormalities. Normal diastolic filling. Normal right ventricular function. No significant valvular regurgitation or stenosis seen. No pericardial effusion seen. Consults:     Consults:     Final Specialist Recommendations/Findings:   IP CONSULT TO INTERNAL MEDICINE  IP CONSULT TO NEUROLOGY  IP CONSULT TO CARDIOLOGY  IP CONSULT TO CASE MANAGEMENT  IP CONSULT TO CARDIOLOGY  IP CONSULT TO HOME CARE NEEDS      Any Hospital Acquired Infections: none    Discharge Functional Status:  stable    DISCHARGE PLAN     Disposition: home    Patient Instructions:   Discharge Medication List as of 1/25/2023  9:55 AM        START taking these medications    Details   benzonatate (TESSALON PERLES) 100 MG capsule Take 1 capsule by mouth 3 times daily as needed for Cough, Disp-21 capsule, R-0Normal           CONTINUE these medications which have CHANGED    Details   predniSONE (DELTASONE) 2.5 MG tablet Take 1 tablet by mouth daily, Disp-30 tablet, R-0Normal           CONTINUE these medications which have NOT CHANGED    Details   simvastatin (ZOCOR) 20 MG tablet Historical Med             Activity: activity as tolerated    Diet: regular diet    [facility]:    [facility] PERLITA [facility]  [address] [address]    Schedule an appointment as soon as possible for a visit in 1 week(s)  Hospital follow-up for syncope secondary to orthostatic hypotension    160 Grace Washburn MD  [address] [place] [address]  [phone]    Schedule an appointment as soon as possible for a visit in 2 week(s)  hosp fu      Patient Instructions: You were admitted to hospital for loss of consciousness which was related to drop in blood pressure on standing. You received IV fluids during hospitalization.   Echocardiogram was normal.  You were evaluated by cardiology and recommended Holter monitor on discharge for 48 hours. You are being discharged home with the following instructions:  -You are advised to drink plenty of electrolyte water like Gatorade  -Please wear compression stockings during the daytime  -Please watch for any dizziness. Be careful when going from sitting to standing position.  -Please follow-up with your PCP  -Please continue your home dose of prednisone 2.5 mg daily for PMR history    Eloisa Schmidt MD  Internal Medicine Resident, PGY-1  1 [facility];  [place], [place]  1/26/2023, 1:23 PM

## 2023-03-31 ENCOUNTER — HOSPITAL ENCOUNTER (OUTPATIENT)
Age: 78
Setting detail: SPECIMEN
Discharge: HOME | End: 2023-03-31

## 2023-03-31 LAB
ALBUMIN SERPL-MCNC: 3.5 G/DL (ref 3.5–5.2)
ALP SERPL-CCNC: 100 U/L (ref 40–129)
ALT SERPL-CCNC: 113 U/L (ref 5–41)
ANION GAP SERPL CALCULATED.3IONS-SCNC: 10 MMOL/L (ref 9–17)
AST SERPL-CCNC: 38 U/L
BILIRUB SERPL-MCNC: 0.5 MG/DL (ref 0.3–1.2)
BUN SERPL-MCNC: 19 MG/DL (ref 8–23)
BUN/CREAT BLD: 28 (ref 9–20)
CALCIUM SERPL-MCNC: 8.7 MG/DL (ref 8.6–10.4)
CHLORIDE SERPL-SCNC: 103 MMOL/L (ref 98–107)
CO2 SERPL-SCNC: 24 MMOL/L (ref 20–31)
CREAT SERPL-MCNC: 0.69 MG/DL (ref 0.7–1.2)
GFR SERPL CREATININE-BSD FRML MDRD: >60 ML/MIN/1.73M2
GLUCOSE SERPL-MCNC: 117 MG/DL (ref 70–99)
HCT VFR BLD AUTO: 26.4 % (ref 40.7–50.3)
HGB BLD-MCNC: 8.7 G/DL (ref 13–17)
MCH RBC QN AUTO: 35.8 PG (ref 25.2–33.5)
MCHC RBC AUTO-ENTMCNC: 33 G/DL (ref 28.4–34.8)
MCV RBC AUTO: 108.6 FL (ref 82.6–102.9)
NRBC AUTOMATED: 0 PER 100 WBC
PDW BLD-RTO: 13.8 % (ref 11.8–14.4)
PLATELET # BLD AUTO: 111 K/UL (ref 138–453)
PMV BLD AUTO: 12.9 FL (ref 8.1–13.5)
POTASSIUM SERPL-SCNC: 3.6 MMOL/L (ref 3.7–5.3)
PROT SERPL-MCNC: 7 G/DL (ref 6.4–8.3)
RBC # BLD: 2.43 M/UL (ref 4.21–5.77)
SODIUM SERPL-SCNC: 137 MMOL/L (ref 135–144)
WBC # BLD AUTO: 2.8 K/UL (ref 3.5–11.3)

## 2023-03-31 PROCEDURE — 80053 COMPREHEN METABOLIC PANEL: CPT

## 2023-03-31 PROCEDURE — P9603 ONE-WAY ALLOW PRORATED MILES: HCPCS

## 2023-03-31 PROCEDURE — 85027 COMPLETE CBC AUTOMATED: CPT

## 2023-03-31 PROCEDURE — 36415 COLL VENOUS BLD VENIPUNCTURE: CPT

## 2023-04-04 ENCOUNTER — HOSPITAL ENCOUNTER (OUTPATIENT)
Age: 78
Setting detail: SPECIMEN
Discharge: HOME | End: 2023-04-04

## 2023-04-04 LAB
ANION GAP SERPL CALCULATED.3IONS-SCNC: 9 MMOL/L (ref 9–17)
BUN SERPL-MCNC: 14 MG/DL (ref 8–23)
BUN/CREAT BLD: 22 (ref 9–20)
CALCIUM SERPL-MCNC: 8.9 MG/DL (ref 8.6–10.4)
CHLORIDE SERPL-SCNC: 99 MMOL/L (ref 98–107)
CO2 SERPL-SCNC: 26 MMOL/L (ref 20–31)
CREAT SERPL-MCNC: 0.63 MG/DL (ref 0.7–1.2)
GFR SERPL CREATININE-BSD FRML MDRD: >60 ML/MIN/1.73M2
GLUCOSE SERPL-MCNC: 103 MG/DL (ref 70–99)
HCT VFR BLD AUTO: 27.4 % (ref 40.7–50.3)
HGB BLD-MCNC: 8.8 G/DL (ref 13–17)
MCH RBC QN AUTO: 35.5 PG (ref 25.2–33.5)
MCHC RBC AUTO-ENTMCNC: 32.1 G/DL (ref 28.4–34.8)
MCV RBC AUTO: 110.5 FL (ref 82.6–102.9)
NRBC AUTOMATED: 0 PER 100 WBC
PDW BLD-RTO: 14.6 % (ref 11.8–14.4)
PHENOBARBITAL: 15.1 UG/ML (ref 15–40)
PLATELET # BLD AUTO: 116 K/UL (ref 138–453)
PMV BLD AUTO: 12.6 FL (ref 8.1–13.5)
POTASSIUM SERPL-SCNC: 4 MMOL/L (ref 3.7–5.3)
RBC # BLD: 2.48 M/UL (ref 4.21–5.77)
SODIUM SERPL-SCNC: 134 MMOL/L (ref 135–144)
WBC # BLD AUTO: 2.4 K/UL (ref 3.5–11.3)

## 2023-04-04 PROCEDURE — 80048 BASIC METABOLIC PNL TOTAL CA: CPT

## 2023-04-04 PROCEDURE — 85027 COMPLETE CBC AUTOMATED: CPT

## 2023-04-04 PROCEDURE — P9603 ONE-WAY ALLOW PRORATED MILES: HCPCS

## 2023-04-04 PROCEDURE — 80184 ASSAY OF PHENOBARBITAL: CPT

## 2023-04-04 PROCEDURE — 36415 COLL VENOUS BLD VENIPUNCTURE: CPT

## 2023-06-30 ENCOUNTER — HOSPITAL ENCOUNTER (OUTPATIENT)
Dept: OCCUPATIONAL THERAPY | Age: 78
Setting detail: THERAPIES SERIES
Discharge: HOME | End: 2023-06-30
Payer: MEDICARE

## 2023-06-30 PROCEDURE — 97110 THERAPEUTIC EXERCISES: CPT

## 2023-06-30 PROCEDURE — 97165 OT EVAL LOW COMPLEX 30 MIN: CPT

## 2023-07-11 ENCOUNTER — APPOINTMENT (OUTPATIENT)
Dept: OCCUPATIONAL THERAPY | Age: 78
End: 2023-07-11
Payer: MEDICARE

## 2023-07-12 ENCOUNTER — HOSPITAL ENCOUNTER (OUTPATIENT)
Dept: OCCUPATIONAL THERAPY | Age: 78
Setting detail: THERAPIES SERIES
Discharge: HOME | End: 2023-07-12
Payer: MEDICARE

## 2023-07-12 PROCEDURE — 97140 MANUAL THERAPY 1/> REGIONS: CPT

## 2023-07-12 PROCEDURE — 97110 THERAPEUTIC EXERCISES: CPT

## 2023-07-12 NOTE — FLOWSHEET NOTE
[address]  [address] [facility].     [phone]  F: [phone]   [] 204 [facility]  [address]   [address]  P: [phone]  F: [phone] [] Juan Syed  P: [phone]  F: [phone]  [x] 97 [facility]  [address]  [place]: [phone]   F: [phone]     Occupational Therapy Daily Treatment Note    Date:  2023  Patient Name:  Chaka Polanco    :  1945  MRN: [id number]  Referring Provider:  Bib Ovalle  Insurance: Medicare  Medical Diagnosis:   M62.81 (ICD-10-CM) - Muscle weakness (generalized)   S06. 5XAA (ICD-10-CM) - Traumatic subdural hemorrhage with loss of consciousness status unknown, initial encounter   Z98.890 (ICD-10-CM) - Other specified postprocedural states                       Rehab Codes: numbness R20.2,, fine motor skills loss R29.818,, or pain in left hand M79.642,  Onset Date: 2023                          Next  [address] [place] [address]: unknown  Visit# / total visits: ; Progress note for Medicare patient due at visit 10    Cancels/No Shows: 0      Subjective:    Pain:  [x] Yes  [] No Location: left middle/ring finger Pain Rating: (0-10 scale) 3/10  Pain altered Tx:  [x] No  [] Yes  Action:  Pt Comments:pain in middle/ring finger down through palm. Working on [facility] activity sheet. Able to  14 coins without dropping.     Objective:  Modalities:  Precautions:  Exercises:  EXERCISE    REPS/     TIME  WEIGHT/    LEVEL COMMENTS   HEP   [facility] activity list    ROM/stretch   Left digits/hand   Metal gripper  35# left   Graded clips  Yellow-black    Gripping golf club for driving/putting   Pain through palm with golf , no pain with putting    Theraputty  red Reviewed ex's , roll, pinch   Small pegs 25  Placed in board, marbles on top for

## 2023-07-17 ENCOUNTER — HOSPITAL ENCOUNTER (OUTPATIENT)
Dept: OCCUPATIONAL THERAPY | Age: 78
Setting detail: THERAPIES SERIES
Discharge: HOME | End: 2023-07-17
Payer: MEDICARE

## 2023-07-17 PROCEDURE — 97140 MANUAL THERAPY 1/> REGIONS: CPT

## 2023-07-17 PROCEDURE — 97110 THERAPEUTIC EXERCISES: CPT

## 2023-07-20 ENCOUNTER — HOSPITAL ENCOUNTER (OUTPATIENT)
Dept: OCCUPATIONAL THERAPY | Age: 78
Setting detail: THERAPIES SERIES
Discharge: HOME | End: 2023-07-20
Payer: MEDICARE

## 2023-07-20 PROCEDURE — 97110 THERAPEUTIC EXERCISES: CPT

## 2023-07-20 PROCEDURE — 97140 MANUAL THERAPY 1/> REGIONS: CPT

## 2023-07-20 NOTE — FLOWSHEET NOTE
[address]  4600 [facility].     [phone]  F: [phone]   [] 204 [facility]  [address]   [address]  P: [phone]  F: [phone] [] Gracy Orozco  P: [phone]  F: [phone]  [x] 97 [facility]  [address]  Alexus Hodgkin: [phone]   F: [phone]     Occupational Therapy Daily Treatment Note    Date:  2023  Patient Name:  Batsheva Tierney    :  1945  MRN: [id number]  Referring Provider:  Dai Azul  Insurance: Medicare  Medical Diagnosis:   M62.81 (ICD-10-CM) - Muscle weakness (generalized)   S06. 5XAA (ICD-10-CM) - Traumatic subdural hemorrhage with loss of consciousness status unknown, initial encounter   Z98.890 (ICD-10-CM) - Other specified postprocedural states                       Rehab Codes: numbness R20.2,, fine motor skills loss R29.818,, or pain in left hand M79.642,  Onset Date: 2023                          Next Dr. Tyrese Boogie: unknown  Visit# / total visits: ; Progress note for Medicare patient due at visit 10    Cancels/No Shows: 0      Subjective:    Pain:  [x] Yes  [] No Location: left middle/ring finger Pain Rating: (0-10 scale) 3/10  Pain altered Tx:  [x] No  [] Yes  Action:  Pt Comments:pain in middle/ring finger down through palm. Working on [facility] activities at home.     Objective:  Modalities:  Precautions:  Exercises:  EXERCISE    REPS/     TIME  WEIGHT/    LEVEL COMMENTS    HEP   [facility] activity list     ROM/stretch   Left digits/hand - focus on extension, stretch intrinsics    WB   Through B hands standing at table to stretch L in full ext    Velcro board   L hand - middle, ring, little Not this date   Graded clips  Red-black            flexbar 20 green U reverse U, C reverse C    Keyhole peg board 25  Manipulation of pegs to

## 2023-07-24 ENCOUNTER — HOSPITAL ENCOUNTER (OUTPATIENT)
Dept: OCCUPATIONAL THERAPY | Age: 78
Setting detail: THERAPIES SERIES
Discharge: HOME | End: 2023-07-24
Payer: MEDICARE

## 2023-07-24 PROCEDURE — 97110 THERAPEUTIC EXERCISES: CPT

## 2023-07-24 NOTE — FLOWSHEET NOTE
forward, 45 back  Tool 162 45in# 90sec left hand    Keyhole peg board 25  Manipulation of pegs to place in board, hold pegs in hand and work to fingertips to place. Difficulty pulling pegs out with thumb/ring and thumb/little   Other:      Treatment Charges: Mins Units   []  Modalities:      []  Ultrasound     [x]  Ther Exercise 45 3   [x]  Manual Therapy     []  Ther Activities     []  Orthotic fit/train     []  Orthotic recheck     []  Other     Total Treatment time 45          Assessment: [x] Progressing toward goals. [] No change. [] Other     [x] Patient would continue to benefit from skilled occupational therapy services in order to: Increase  functional /grasp, Strength, Activity tolerance, Coordination deficit, Complaint of pain, and Cognitive abilities in order to improve functional use of UE in ADL performance, decrease pain in UE for safe completion of ADLs, and return to PLOF     Focused on digit extension, WB, stretch in extension. Added BTE ex's for  and hand strength. Cont with fine motor control activities at home    Short Term Goals: (  6    Treatments)  Decrease Pain: 3/10 with gripping  Increase left  strength 20(pounds)  Increase left hand coordination evident by decrease in 9hole test by 10seconds  Increase function:UE Functional Index Score 15 or more points to promote increased functional abilities  Patient to be independent with home exercise program as demonstrated by performance with correct form without cues. Long Term Goals: (  12  Treatments)  Decrease Pain: 0/10 with gripping  Increase left  strength 30+(pounds)  Increase left hand coordination for independent tying shoes  Increase function:UE Functional Index Score 25+ or more points to promote increased functional abilities  Patient to be independent with /swinging golf club without pain        Patient Goals: Wants to golf again         Pt.  Education:  [x] Yes  [] No  [x] Reviewed Prior HEP/Ed  Method of

## 2023-07-28 ENCOUNTER — HOSPITAL ENCOUNTER (OUTPATIENT)
Dept: OCCUPATIONAL THERAPY | Age: 78
Setting detail: THERAPIES SERIES
Discharge: HOME | End: 2023-07-28
Payer: MEDICARE

## 2023-07-28 PROCEDURE — 97110 THERAPEUTIC EXERCISES: CPT

## 2023-07-28 NOTE — PROGRESS NOTES
[address] [facility]  4[address] [facility] [place] [facility].     [phone]  F: [phone]   [] 204 [facility]  [address]   [address]  P: [phone]  F: [phone] [] Juan Syed  P: [phone]  F: [phone]  [x] 97 [facility]  [address]  [place]: [phone]   F: [phone]     Occupational Therapy Daily Treatment Note    Date:  2023  Patient Name:  Ashanti Lopez    :  1945  MRN: [id number]  Referring Provider:  Yolanda Naqvi  Insurance: Medicare  Medical Diagnosis:   M62.81 (ICD-10-CM) - Muscle weakness (generalized)   S06. 5XAA (ICD-10-CM) - Traumatic subdural hemorrhage with loss of consciousness status unknown, initial encounter   Z98.890 (ICD-10-CM) - Other specified postprocedural states                       Rehab Codes: numbness R20.2,, fine motor skills loss R29.818,, or pain in left hand M79.642,  Onset Date: 2023                          Next Dr. Syd Amaya: unknown  Visit# / total visits: ; Progress note for Medicare patient due at visit 10    Cancels/No Shows: 0      Subjective:    Pain:  [x] Yes  [] No Location: left middle/ring finger Pain Rating: (0-10 scale) 0/10  Pain altered Tx:  [x] No  [] Yes  Action:  Pt Comments: Frustrated with trying to put tape down to paint this weekend    Objective:  Modalities:  Precautions:  Exercises:  EXERCISE    REPS/     TIME  WEIGHT/    LEVEL COMMENTS    ROM/stretch   Left digits/hand - focus on extension, stretch intrinsics    Sensation/FMC   Rolling flex bar, walking fingers on bar  Towel exs - using only left hand turn washcloth around by grasping edge corner to corner around           ADL cloth   Tying, buttons, latches, zipping    Graded clips  Yellow, red, green, blue Left hand    Keyhole peg board 25  Manipulation of

## 2023-07-31 ENCOUNTER — HOSPITAL ENCOUNTER (OUTPATIENT)
Dept: OCCUPATIONAL THERAPY | Age: 78
Setting detail: THERAPIES SERIES
Discharge: HOME | End: 2023-07-31
Payer: MEDICARE

## 2023-07-31 PROCEDURE — 97530 THERAPEUTIC ACTIVITIES: CPT

## 2023-07-31 PROCEDURE — 97110 THERAPEUTIC EXERCISES: CPT

## 2023-07-31 NOTE — FLOWSHEET NOTE
[address]  4600 [facility].     [phone]  F: [phone]   [] [address] [place] [address]  20 [facility]   [address]  P: [phone]  F: [phone] [] [facility]  P: [phone]  F: [phone]  [x] 97 [facility]  [address] [facility] [address]  [place]: [phone]   F: [phone]     Occupational Therapy Daily Treatment Note    Date:  2023  Patient Name:  Lyndon Nelson    :  1945  MRN: [id number]  Referring Provider:  Duane Matas  Insurance: Medicare  Medical Diagnosis:   M62.81 (ICD-10-CM) - Muscle weakness (generalized)   S06. 5XAA (ICD-10-CM) - Traumatic subdural hemorrhage with loss of consciousness status unknown, initial encounter   Z98.890 (ICD-10-CM) - Other specified postprocedural states                       Rehab Codes: numbness R20.2,, fine motor skills loss R29.818,, or pain in left hand M79.642,  Onset Date: 2023                          Next Dr. Marie Godoy: unknown  Visit# / total visits: ; Progress note for Medicare patient due at visit 10    Cancels/No Shows: 0      Subjective:    Pain:  [x] Yes  [] No Location: left middle/ring finger Pain Rating: (0-10 scale) 0/10  Pain altered Tx:  [x] No  [] Yes  Action:  Pt Comments: Pt states he failed the towel exercise at home.  States he is going to golf with his brother this week    Objective:  Modalities:  Precautions:  Exercises:  EXERCISE    REPS/     TIME  WEIGHT/    LEVEL COMMENTS    ROM/stretch   Left digits/hand - focus on extension, stretch intrinsics    Sensation/FMC   Rolling flex bar, walking fingers on bar  Towel exs - using only left hand turn washcloth around by grasping edge corner to corner around reviewed          [place] Nuts/Bolts   Large bolts- left hand loosen and tighten without using vision to

## 2023-08-03 ENCOUNTER — HOSPITAL ENCOUNTER (OUTPATIENT)
Dept: OCCUPATIONAL THERAPY | Age: 78
Setting detail: THERAPIES SERIES
Discharge: HOME | End: 2023-08-03
Payer: MEDICARE

## 2023-08-03 PROCEDURE — 97110 THERAPEUTIC EXERCISES: CPT

## 2023-08-03 NOTE — FLOWSHEET NOTE
without pain        Patient Goals: Wants to golf again         Pt. Education:  [x] Yes  [] No  [x] Reviewed Prior HEP/Ed  Method of Education: [x] Verbal  [x] Demo  [] Written  Re:reviewed towel walking without dropping with left hand  Comprehension of Education:  [x] Verbalizes understanding. [x] Demonstrates understanding. [x] Needs review. [] Demonstrates/verbalizes HEP/Ed previously given. Plan: [x] Continue current frequency toward short and long term goals.   [x] Specific Instructions for subsequent treatments: strength, coord, sensation   [] Other:       Time In: 755 Time Out: 845        Electronically signed by:  CORNELIO Burch/MIKAYLA

## 2023-08-07 ENCOUNTER — HOSPITAL ENCOUNTER (OUTPATIENT)
Dept: OCCUPATIONAL THERAPY | Age: 78
Setting detail: THERAPIES SERIES
Discharge: HOME | End: 2023-08-07
Payer: MEDICARE

## 2023-08-07 PROCEDURE — 97110 THERAPEUTIC EXERCISES: CPT

## 2023-08-07 NOTE — FLOWSHEET NOTE
With tweezers placed small metal pegs in holes left hand    Green putty   Press cone into putty, twist    Small peg board 25  Place every other row, every other hole - pt reaching into container to find only red pegs by sifting through/manipulating pegs to find. Place marbles on top for [facility] Not this date   Other: asked pt to bring his theraputty next visit to start some new exs      Treatment Charges: Mins Units   []  Modalities:      []  Ultrasound     [x]  Ther Exercise 50 3   []  Manual Therapy     []  Ther Activities     []  Orthotic fit/train     []  Orthotic recheck     []  Other     Total Treatment time 50          Medicare Cap   [] Physical Therapy  [] Speech Therapy  [x] Occupational therapy  *PT and Speech caps combine      $2230 Limit for PT and Speech combined  $2230 Limit for OT individually  At the beginning of the month where you expect to go over $2230, please add the [facility] modifier      Patient Name: Herson Palm: 1945    Note:  This is an estimate of charges billed. Date of [address] Name # units/ charge $$$ charge Daily Total Charge Ongoing Total $$$   8/7/23 There ex 1 $28.13 $28.13    8/7/23 There ex 2 $21.75 $43.50 $693.42                  Assessment: [x] Progressing toward goals. [] No change. [] Other     [x] Patient would continue to benefit from skilled occupational therapy services in order to: Increase  functional /grasp, Strength, Activity tolerance, Coordination deficit, Complaint of pain, and Cognitive abilities in order to improve functional use of UE in ADL performance, decrease pain in UE for safe completion of ADLs, and return to PLOF     Finger strength ex's with velcro roller baord, [facility] activity. Left hand strength. Pt hand becomes shaky when fatiguing digit extensors    Short Term Goals: (  6    Treatments)  Decrease Pain: 3/10 with gripping.  MET  Increase left  strength 20(pounds) NOT MET  Increase left hand coordination evident by decrease in

## 2023-08-11 ENCOUNTER — HOSPITAL ENCOUNTER (OUTPATIENT)
Dept: OCCUPATIONAL THERAPY | Age: 78
Setting detail: THERAPIES SERIES
Discharge: HOME | End: 2023-08-11
Payer: MEDICARE

## 2023-08-11 PROCEDURE — 97110 THERAPEUTIC EXERCISES: CPT

## 2023-08-11 NOTE — FLOWSHEET NOTE
strength 20(pounds) NOT MET  Increase left hand coordination evident by decrease in 9hole test by 10seconds NOT MET  Increase function:UE Functional Index Score 15 or more points to promote increased functional abilities MET  Patient to be independent with home exercise program as demonstrated by performance with correct form without cues. MET     Long Term Goals: (  12  Treatments)  Decrease Pain: 0/10 with gripping  Increase left  strength 30+(pounds)  Increase left hand coordination for independent tying shoes  Increase function:UE Functional Index Score 25+ or more points to promote increased functional abilities  Patient to be independent with /swinging golf club without pain        Patient Goals: Wants to golf again         Pt. Education:  [x] Yes  [] No  [x] Reviewed Prior HEP/Ed  Method of Education: [x] Verbal  [x] Demo  [] Written  Re:reviewed towel walking without dropping with left hand  Comprehension of Education:  [x] Verbalizes understanding. [x] Demonstrates understanding. [x] Needs review. [] Demonstrates/verbalizes HEP/Ed previously given. Plan: [x] Continue current frequency toward short and long term goals.   [x] Specific Instructions for subsequent treatments: strength, coord, sensation   [] Other:       Time In: 930 Time Out: 1015        Electronically signed by:  CORNELIO Jean/MIKAYLA

## 2023-08-14 ENCOUNTER — HOSPITAL ENCOUNTER (OUTPATIENT)
Dept: OCCUPATIONAL THERAPY | Age: 78
Setting detail: THERAPIES SERIES
Discharge: HOME | End: 2023-08-14
Payer: MEDICARE

## 2023-08-14 PROCEDURE — 97110 THERAPEUTIC EXERCISES: CPT

## 2023-08-14 NOTE — FLOWSHEET NOTE
test by 10seconds NOT MET  Increase function:UE Functional Index Score 15 or more points to promote increased functional abilities MET  Patient to be independent with home exercise program as demonstrated by performance with correct form without cues. MET     Long Term Goals: (  12  Treatments)  Decrease Pain: 0/10 with gripping  Increase left  strength 30+(pounds)  Increase left hand coordination for independent tying shoes  Increase function:UE Functional Index Score 25+ or more points to promote increased functional abilities  Patient to be independent with /swinging golf club without pain        Patient Goals: Wants to golf again         Pt. Education:  [x] Yes  [] No  [x] Reviewed Prior HEP/Ed  Method of Education: [x] Verbal  [x] Demo  [] Written  Re:reviewed theraputty and isometrics  Comprehension of Education:  [x] Verbalizes understanding. [x] Demonstrates understanding. [x] Needs review. [] Demonstrates/verbalizes HEP/Ed previously given. Plan: [x] Continue current frequency toward short and long term goals.   [x] Specific Instructions for subsequent treatments: strength, coord, sensation   [] Other:       Time In: 915 Time Out: 1000        Electronically signed by:  Kimberlee Ogden OTR/MIKAYLA

## 2023-08-18 ENCOUNTER — HOSPITAL ENCOUNTER (OUTPATIENT)
Dept: OCCUPATIONAL THERAPY | Age: 78
Setting detail: THERAPIES SERIES
Discharge: HOME | End: 2023-08-18
Payer: MEDICARE

## 2023-08-18 PROCEDURE — 97110 THERAPEUTIC EXERCISES: CPT

## 2023-08-18 NOTE — DISCHARGE SUMMARY
[address]  46[address] [facility].     [phone]  F: [phone]   [] 204 [facility]  [address]   [address]  P: [phone]  F: [phone] [] Gracy Orozco  P: [phone]  F: [phone]  [x] 97 [facility]  [address]  [place]: [phone]   F: [phone]     Occupational Therapy Daily Treatment Note    Date:  2023  Patient Name:  Sharad Burnette    :  1945  MRN: [id number]  Referring Provider:  Carl Rodriguez  Insurance: Medicare  Medical Diagnosis:   M62.81 (ICD-10-CM) - Muscle weakness (generalized)   S06. 5XAA (ICD-10-CM) - Traumatic subdural hemorrhage with loss of consciousness status unknown, initial encounter   Z98.890 (ICD-10-CM) - Other specified postprocedural states                       Rehab Codes: numbness R20.2,, fine motor skills loss R29.818,, or pain in left hand M79.642,  Onset Date: 2023                          Next Dr. Luis Rosario: unknown  Visit# / total visits: ; Progress note for Medicare patient due at visit 10    Cancels/No Shows: 0      Subjective:    Pain:  [] Yes  [x] No Location:  Pain Rating: (0-10 scale) 0/10  Pain altered Tx:  [x] No  [] Yes  Action:  Pt Comments: Had the best sleep he's had in years last night. Pt going golfing with his brother this weekend. Does not hurt his hand however fe fatigues easily    Objective:  Modalities:  Precautions:  Exercises:  EXERCISE    REPS/     TIME  WEIGHT/    LEVEL COMMENTS    ROM/stretch   Left digits/hand - focus on extension, stretch intrinsics    flexbar  green U, reverse U, twist    Velcro board   #5,#6 rolling with fingertips    dumbell 2x15 5# Wrist flex/ext over gray foam, pro/supination    Green putty   Press cone into putty, twist    Keyhole board   Complete with left hand.

## 2024-08-19 ENCOUNTER — HOSPITAL ENCOUNTER (OUTPATIENT)
Dept: OCCUPATIONAL THERAPY | Age: 79
Setting detail: THERAPIES SERIES
Discharge: HOME | End: 2024-08-19
Payer: MEDICARE

## 2024-08-19 PROCEDURE — 97165 OT EVAL LOW COMPLEX 30 MIN: CPT

## 2024-08-19 PROCEDURE — 97110 THERAPEUTIC EXERCISES: CPT

## 2024-08-19 NOTE — THERAPY EVALUATION
Cold/hotpack        []  Medication allergies reviewed for use of    Dexamethasone Sodium Phosphate 4mg/ml     with iontophoresis treatments.   Pt is not allergic.     Treatment Plan:  Frequency: 1-2 x/week for 10 visits     Today's Treatment:  Modalities:  Precautions:  None  Exercise Flow Sheet:  Exercise Reps/Time Weight/Level Comments   HEP   Theraputty -reviewed                                 Other:   HEP  Access Code: [email or web address]  URL: [email or web address]  Date: 08/19/2024  Prepared by: Gila Turner    Exercises  - Putty Squeezes   - Tip Pinch with Putty   - Rolling Putty on Table   - Finger Pinch and Pull with Putty       Specific Instructions for Next Treatment: Review HEP    Evaluation Complexity:  History (Personal factors, comorbidities) []  0 [x]  1-2 []  3+   Exam (limitations, restrictions) []  1-2 [x]  3 []  4+   Decision Making [x]  Low []  Moderate []  High   ? [x]  Low Complexity []  Moderate   Complexity []  High Complexity      Treatment Charges: Mins Units Time In/Out   Evaluation  []  High  []  Moderate  [x]  Low  43  1    [x]  Ther Exercise 10 1    []  Manual Therapy      []  Ther Activities      []  Orthotic fit/train      []  Orthotic recheck      []        Total  time 53 min       Time In: 218   Time out: 311   Electronically signed by CORNELIO Ambrose/L on 8/19/2024 at 2:19 PM    Physician Signature: _________________________ Date: _______________  By signing above or cosigning this note, I have reviewed this plan of care and certify a need for medically necessary rehabilitation services.     *PLEASE SIGN ABOVE AND FAX BACK ALL PAGES*

## 2024-08-22 ENCOUNTER — HOSPITAL ENCOUNTER (OUTPATIENT)
Dept: OCCUPATIONAL THERAPY | Age: 79
Setting detail: THERAPIES SERIES
Discharge: HOME | End: 2024-08-22
Payer: MEDICARE

## 2024-08-22 PROCEDURE — 97110 THERAPEUTIC EXERCISES: CPT

## 2024-08-22 NOTE — FLOWSHEET NOTE
[facility]  Outpatient Rehabilitation &  Therapy  [address]     [phone]  F: [phone]   [] [facility]  Outpatient Rehabilitation &  Therapy  [address]   [address]  P: [phone]  F: [phone] [] [facility]  Outpatient Rehabilitation &  Therapy  [address]  P: [phone]  F: [phone]  [x] [facility]   Outpatient Rehabilitation & Therapy  [address]  P: [phone]   F: [phone]     Occupational Therapy Daily Treatment Note    Date:  2024  Patient Name:  Delonte Velez    :  1945  MRN: [id number]  Referring Provider:  Evaristo Leslie APRN - NP   Insurance: Medicare   Medical Diagnosis: M62.81 (ICD-10-CM) - Hand Muscle weakness (generalized)             Rehab Codes: pain in hand M79.646,, stiffness in hand M25.64,, numbness R20.2,, or muscle weakness generalized M62.81,  Onset Date: 24 date of eval (progressing over time)                  Next  Appt: unknown     Visit# / total visits: 2/10; Progress note for Medicare patient due at visit 10, address goals visit 5    Cancels/No Shows: 0/0      Subjective:    Pain:  [x] Yes  [] No Location: chest secondary to dermatology procedure yesterday , and left hand Pain Rating: (0-10 scale) 3/10 hand,  5/10 chest  Pain altered Tx:  [x] No  [] Yes  Action:  Pt Comments: Had procedure yesterday, did not sleep well    Objective:  Modalities:  Precautions: None  Exercises:  EXERCISE    REPS/     TIME  WEIGHT/    LEVEL COMMENTS Complete X   HEP   Theraputty- , roll, pinch, pull Reviewed   ROM   Digits, wrist x   Graded Clips  Yellow-black Place on vertical pole/remove X added   Metal gripper 2x20 45# Left hand X added   Keyhole pegs   In by 5, out by 5 X added decreased in hand manipulation   velcroboard x4  #5 isolate fingers X added   Green putty   Press cone into putty lrg/small  X added                        Other:      HEP  Access

## 2024-08-27 ENCOUNTER — HOSPITAL ENCOUNTER (OUTPATIENT)
Dept: OCCUPATIONAL THERAPY | Age: 79
Setting detail: THERAPIES SERIES
Discharge: HOME | End: 2024-08-27
Payer: MEDICARE

## 2024-08-27 PROCEDURE — 97530 THERAPEUTIC ACTIVITIES: CPT

## 2024-08-27 PROCEDURE — 97110 THERAPEUTIC EXERCISES: CPT

## 2024-08-27 PROCEDURE — 97140 MANUAL THERAPY 1/> REGIONS: CPT

## 2024-08-27 NOTE — FLOWSHEET NOTE
[facility]  Outpatient Rehabilitation &  Therapy  [address]     [phone]  F: [phone]   [] [facility]  Outpatient Rehabilitation &  Therapy  [address]   [address]  P: [phone]  F: [phone] [] [facility]  Outpatient Rehabilitation &  Therapy  [address]  P: [phone]  F: [phone]  [x] [facility]   Outpatient Rehabilitation & Therapy  [address]  P: [phone]   F: [phone]     Occupational Therapy Daily Treatment Note    Date:  2024  Patient Name:  Delonte Velez    :  1945  MRN: [id number]  Referring Provider:  Evaristo Leslie APRN - NP   Insurance: Medicare   Medical Diagnosis: M62.81 (ICD-10-CM) - Hand Muscle weakness (generalized)             Rehab Codes: pain in hand M79.646,, stiffness in hand M25.64,, numbness R20.2,, or muscle weakness generalized M62.81,  Onset Date: 24 date of eval (progressing over time)                  Next  Appt: unknown     Visit# / total visits: 3/10; Progress note for Medicare patient due at visit 10, address goals visit 5    Cancels/No Shows: 0/0      Subjective:    Pain:  [] Yes  [] No Location:  Pain Rating: (0-10 scale) 0/10 hand  Pain altered Tx:  [x] No  [] Yes  Action:  Pt Comments: States had a hard time putting on belt this morning    Objective:  Modalities:  Precautions: None  Exercises:  EXERCISE    REPS/     TIME  WEIGHT/    LEVEL COMMENTS Complete X   HEP   Theraputty- , roll, pinch, pull Reviewed   ROM   Digits, wrist, massage x   Graded Clips  Yellow-black Place on vertical pole/remove -    Metal gripper 2x20 45# Left hand X    Keyhole pegs   In by 1, out by all 25 hold in hand X dropped 2   velcroboard x4  #5 isolate fingers X added   Green putty   Press cone into putty lrg/small     velcroboard   #5 isolate finger X added   Graded clips  Yellow, red, green  [id number] blocks x20 each X added   Tan/red pegs     without dropping    Pt. Education:  [x] Yes  [] No  [] Reviewed Prior HEP/Ed  Method of Education: [x] Verbal  [] Demo  [] Written  Re: activities  Comprehension of Education:  [x] Verbalizes understanding.  [x] Demonstrates understanding.  [] Needs review.  [] Demonstrates/verbalizes HEP/Ed previously given.      Plan: [x] Continue current frequency toward short and long term goals.  [x] Specific Instructions for subsequent treatments: [facility]   [] Other:       Time In: 902  Time Out: 946        Electronically signed by:  CORNELIO Ambrose/MIKAYLA

## 2024-08-30 ENCOUNTER — HOSPITAL ENCOUNTER (OUTPATIENT)
Dept: OCCUPATIONAL THERAPY | Age: 79
Setting detail: THERAPIES SERIES
Discharge: HOME | End: 2024-08-30
Payer: MEDICARE

## 2024-08-30 PROCEDURE — 97110 THERAPEUTIC EXERCISES: CPT

## 2024-08-30 PROCEDURE — 97140 MANUAL THERAPY 1/> REGIONS: CPT

## 2024-08-30 NOTE — FLOWSHEET NOTE
[facility]  Outpatient Rehabilitation &  Therapy  [address]     [phone]  F: [phone]   [] [facility]  Outpatient Rehabilitation &  Therapy  [address]   [address]  P: [phone]  F: [phone] [] [facility]  Outpatient Rehabilitation &  Therapy  [address]  P: [phone]  F: [phone]  [x] [facility]   Outpatient Rehabilitation & Therapy  [address]  P: [phone]   F: [phone]     Occupational Therapy Daily Treatment Note    Date:  2024  Patient Name:  Delonte Velez    :  1945  MRN: [id number]  Referring Provider:  Evaristo Leslie APRN - NP   Insurance: Medicare   Medical Diagnosis: M62.81 (ICD-10-CM) - Hand Muscle weakness (generalized)             Rehab Codes: pain in hand M79.646,, stiffness in hand M25.64,, numbness R20.2,, or muscle weakness generalized M62.81,  Onset Date: 24 date of eval (progressing over time)                  Next  Appt: unknown     Visit# / total visits: 4/10; Progress note for Medicare patient due at visit 10, address goals visit 5    Cancels/No Shows: 0/0      Subjective:    Pain:  [x] Yes  [] No Location:  Pain Rating: (0-10 scale)  4 /10  lt hand  Pain altered Tx:  [x] No  [] Yes  Action:  Pt Comments: Pt reports his joints in lt hand are stiff.   Objective:  Modalities:  Precautions: None  Exercises:  EXERCISE    REPS/     TIME  WEIGHT/    LEVEL COMMENTS Complete X   HEP   Theraputty- , roll, pinch, pull    ROM   Digits, wrist, massage,lt hand  PIP  blocking exercises 2 sets 20x X(Added PIP blocking exercises.   Graded Clips  Yellow-black Place on vertical pole/remove X    Metal gripper 2x20 45# Left hand x   Keyhole pegs   In by 1, out by all 25 hold in hand X (pt only able to hold 5 pegs at a time in his hand when removing pegs.)   velcroboard x4  #5 isolate fingers X   Green putty 20 x each   Press cone into putty lrg/small  X    velcroboard   #5 isolate finger    Graded clips  Yellow, red, green  [id number] blocks x20 each    Tan/red pegs    Flip pegs over  & back 4 rows X   Velcro  board  (1\" x 1\" square pieces with loops)   Use  lt hand alternate fingers use merle pinch to remove/place pieces.  X (Added )                        Other:      HEP  Access Code: [email or web address]  URL: [email or web address]  Date: 08/19/2024  Prepared by: Gila Turner     Exercises  - Putty Squeezes   - Tip Pinch with Putty   - Rolling Putty on Table   - Finger Pinch and Pull with Putty          Treatment Charges: Mins Units Time in -Time out    []  Modalities:       []  Ultrasound      [x]  Ther Exercise 33 2 08:45-09:18   [x]  Manual Therapy 15 1 08:30-08:45   []  Ther Activities      []  Orthotic fit/train      []  Orthotic recheck      []  Other      Total untimed      Total Timed 48           Assessment: [x] Progressing toward goals. Pt tolerates tx well.  Pain lt hand today.Tightness felt lt hand during PROM. Massage & PROM to decrease lt hand tightness.Pt completed lt hand strengthening,manual  & fine motor dexterity exercises /activities. Pt has difficulty picking up & placing key pegs ,dropping many pegs & reporting numbness his lt hand.Added velcro  board to work on lt hand dexterity & strength.verbal cues with exercises.See OT exercises for details.  .     [] No change.  [] Other     [x] Patient would continue to benefit from skilled occupational therapy services in order to: Improve  Safety awareness, ROM, Strength, Coordination deficit, and Complaint of pain in order to perform ADLs with increased safety, improve functional use of UE in ADL performance, decrease pain in UE for safe completion of ADLs, and return to PLOF     STG/LTG    Short Term Goals: (  5    Treatments)  Decrease Pain: 2/10  Decrease 9 hole time by 6sec for improved fine motor left hand  Increase left hand strength (pounds)   by 10lbs to improve ADL

## 2024-09-05 ENCOUNTER — HOSPITAL ENCOUNTER (OUTPATIENT)
Dept: OCCUPATIONAL THERAPY | Age: 79
Setting detail: THERAPIES SERIES
Discharge: HOME | End: 2024-09-05
Payer: MEDICARE

## 2024-09-05 PROCEDURE — 97110 THERAPEUTIC EXERCISES: CPT

## 2024-09-05 PROCEDURE — 97140 MANUAL THERAPY 1/> REGIONS: CPT

## 2024-09-05 NOTE — PROGRESS NOTES
[facility]  Outpatient Rehabilitation &  Therapy  [address]     [phone]  F: [phone]   [] [facility]  Outpatient Rehabilitation &  Therapy  [address]   [address]  P: [phone]  F: [phone] [] [facility]  Outpatient Rehabilitation &  Therapy  [address]  P: [phone]  F: [phone]  [x] [facility]   Outpatient Rehabilitation & Therapy  [address]  P: [phone]   F: [phone]     Occupational Therapy Daily Treatment Note/Progress Note    Date:  2024  Patient Name:  Delonte Velez    :  1945  MRN: [id number]  Referring Provider:  Evaristo Leslie APRN - NP   Insurance: Medicare   Medical Diagnosis: M62.81 (ICD-10-CM) - Hand Muscle weakness (generalized)             Rehab Codes: pain in hand M79.646,, stiffness in hand M25.64,, numbness R20.2,, or muscle weakness generalized M62.81,  Onset Date: 24 date of eval (progressing over time)                  Next  Appt: unknown     Visit# / total visits: 5/10; Progress note for Medicare patient due at visit 10, address goals visit 5    Cancels/No Shows: 0/0  Reporting Period: 24-24    Subjective:    Pain:  [] Yes  [x] No Location:  Pain Rating: (0-10 scale)  0 /10  lt hand  Pain altered Tx:  [x] No  [] Yes  Action:  Pt Comments: Pt reports he was able to button 2 buttons this morning and tie one of his shoes this am    Objective:  Modalities:  Precautions: None  Exercises:  EXERCISE    REPS/     TIME  WEIGHT/    LEVEL COMMENTS Complete X   HEP   Theraputty- , roll, pinch, pull    ROM   Digits, wrist, massage,lt hand  PIP  blocking exercises 2 sets 20x X   Graded Clips  Yellow-black Place on vertical pole/remove X    Metal gripper 2x20 45# Left hand X    Keyhole pegs   In by 5, out by 5 x   velcroboard x4  #5 isolate fingers -   Green putty 20 x each   Press cone into putty lrg/small  x   velcroboard   #5 isolate finger

## 2024-09-09 ENCOUNTER — HOSPITAL ENCOUNTER (OUTPATIENT)
Dept: OCCUPATIONAL THERAPY | Age: 79
Setting detail: THERAPIES SERIES
Discharge: HOME | End: 2024-09-09
Payer: MEDICARE

## 2024-09-09 PROCEDURE — 97110 THERAPEUTIC EXERCISES: CPT

## 2024-09-12 ENCOUNTER — HOSPITAL ENCOUNTER (OUTPATIENT)
Dept: OCCUPATIONAL THERAPY | Age: 79
Setting detail: THERAPIES SERIES
Discharge: HOME | End: 2024-09-12
Payer: MEDICARE

## 2024-09-12 PROCEDURE — 97110 THERAPEUTIC EXERCISES: CPT

## 2024-09-16 ENCOUNTER — HOSPITAL ENCOUNTER (OUTPATIENT)
Dept: OCCUPATIONAL THERAPY | Age: 79
Setting detail: THERAPIES SERIES
Discharge: HOME | End: 2024-09-16
Payer: MEDICARE

## 2024-09-16 PROCEDURE — 97110 THERAPEUTIC EXERCISES: CPT

## 2024-09-20 ENCOUNTER — HOSPITAL ENCOUNTER (OUTPATIENT)
Dept: OCCUPATIONAL THERAPY | Age: 79
Setting detail: THERAPIES SERIES
Discharge: HOME | End: 2024-09-20
Payer: MEDICARE

## 2024-09-20 PROCEDURE — 97110 THERAPEUTIC EXERCISES: CPT

## 2024-09-24 ENCOUNTER — HOSPITAL ENCOUNTER (OUTPATIENT)
Dept: OCCUPATIONAL THERAPY | Age: 79
Setting detail: THERAPIES SERIES
Discharge: HOME | End: 2024-09-24
Payer: MEDICARE

## 2024-09-24 PROCEDURE — 97110 THERAPEUTIC EXERCISES: CPT
